# Patient Record
Sex: MALE | Race: WHITE | NOT HISPANIC OR LATINO | Employment: FULL TIME | URBAN - METROPOLITAN AREA
[De-identification: names, ages, dates, MRNs, and addresses within clinical notes are randomized per-mention and may not be internally consistent; named-entity substitution may affect disease eponyms.]

---
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
## 2021-01-20 ENCOUNTER — TELEPHONE (OUTPATIENT)
Dept: PSYCHIATRY | Facility: CLINIC | Age: 21
End: 2021-01-20

## 2021-01-20 ENCOUNTER — TELEPHONE (OUTPATIENT)
Dept: OTHER | Facility: OTHER | Age: 21
End: 2021-01-20

## 2021-03-03 ENCOUNTER — OFFICE VISIT (OUTPATIENT)
Dept: URGENT CARE | Age: 21
End: 2021-03-03
Payer: COMMERCIAL

## 2021-03-03 VITALS — HEART RATE: 84 BPM | RESPIRATION RATE: 20 BRPM | OXYGEN SATURATION: 98 % | TEMPERATURE: 96.1 F

## 2021-03-03 DIAGNOSIS — B96.89 ACUTE BACTERIAL PHARYNGITIS: Primary | ICD-10-CM

## 2021-03-03 DIAGNOSIS — J02.8 ACUTE BACTERIAL PHARYNGITIS: Primary | ICD-10-CM

## 2021-03-03 LAB — S PYO AG THROAT QL: NEGATIVE

## 2021-03-03 PROCEDURE — 87070 CULTURE OTHR SPECIMN AEROBIC: CPT | Performed by: PHYSICIAN ASSISTANT

## 2021-03-03 PROCEDURE — 99203 OFFICE O/P NEW LOW 30 MIN: CPT | Performed by: PHYSICIAN ASSISTANT

## 2021-03-03 PROCEDURE — 87880 STREP A ASSAY W/OPTIC: CPT | Performed by: PHYSICIAN ASSISTANT

## 2021-03-03 RX ORDER — AMOXICILLIN 500 MG/1
500 CAPSULE ORAL EVERY 12 HOURS SCHEDULED
Qty: 20 CAPSULE | Refills: 0 | Status: SHIPPED | OUTPATIENT
Start: 2021-03-03 | End: 2021-03-13

## 2021-03-03 NOTE — PROGRESS NOTES
[facility]        NAME: Dennie Saba is a 23 y o  male  : 2000    MRN: [id number]  DATE: March 3, 2021  TIME: 4:34 PM    Assessment and Plan   Acute bacterial pharyngitis [J02 8, B96 89]  1  Acute bacterial pharyngitis  amoxicillin (AMOXIL) 500 mg capsule    Throat culture    POCT rapid strepA         Patient Instructions       Follow up with PCP in 3-5 days  Proceed to  ER if symptoms worsen  Chief Complaint     Chief Complaint   Patient presents with    Sore Throat     Pt states started with symptoms Friday, had covid test which came back today and is negative, continues with sore throat    Headache         History of Present Illness         Patient started with a sore throat on Friday  He has also been having headaches  He denies any fevers  He denies any COVID exposure  He had a COVID test that was done at work on Friday which came back negative  He does have pain with swallowing  Review of Systems   Review of Systems   Constitutional: Negative  HENT: Positive for sore throat  Respiratory: Negative  Cardiovascular: Negative  Musculoskeletal: Negative  Neurological: Positive for headaches  Psychiatric/Behavioral: Negative  Current Medications       Current Outpatient Medications:     amoxicillin (AMOXIL) 500 mg capsule, Take 1 capsule (500 mg total) by mouth every 12 (twelve) hours for 10 days, Disp: 20 capsule, Rfl: 0    Current Allergies     Allergies as of 2021    (No Known Allergies)            The following portions of the patient's history were reviewed and updated as appropriate: allergies, current medications, past family history, past medical history, past social history, past surgical history and problem list      History reviewed  No pertinent past medical history  History reviewed  No pertinent surgical history  History reviewed  No pertinent family history  Medications have been verified          Objective   Pulse 84   Temp (!) 96 1 °F (35 6 °C)   Resp 20   SpO2 98%        Physical Exam     Physical Exam  Vitals signs and nursing note reviewed  Constitutional:       General: He is not in acute distress  Appearance: Normal appearance  He is not ill-appearing, toxic-appearing or diaphoretic  HENT:      Head: Normocephalic and atraumatic  Right Ear: Tympanic membrane normal  No middle ear effusion  Tympanic membrane is not erythematous  Left Ear: Tympanic membrane normal   No middle ear effusion  Tympanic membrane is not erythematous  Nose: No congestion  Mouth/Throat:      Mouth: Mucous membranes are moist       Pharynx: Uvula midline  Posterior oropharyngeal erythema present  No oropharyngeal exudate  Tonsils: No tonsillar exudate  Cardiovascular:      Rate and Rhythm: Normal rate and regular rhythm  Pulses: Normal pulses  Heart sounds: Normal heart sounds  Pulmonary:      Effort: Pulmonary effort is normal       Breath sounds: Normal breath sounds  No wheezing  Lymphadenopathy:      Cervical: Cervical adenopathy present  Skin:     General: Skin is warm and dry  Neurological:      General: No focal deficit present  Mental Status: He is alert and oriented to person, place, and time     Psychiatric:         Mood and Affect: Mood normal          Behavior: Behavior normal

## 2021-03-05 LAB — BACTERIA THROAT CULT: NORMAL

## 2021-08-22 ENCOUNTER — OFFICE VISIT (OUTPATIENT)
Dept: URGENT CARE | Age: 21
End: 2021-08-22
Payer: COMMERCIAL

## 2021-08-22 VITALS
SYSTOLIC BLOOD PRESSURE: 148 MMHG | HEART RATE: 72 BPM | RESPIRATION RATE: 16 BRPM | DIASTOLIC BLOOD PRESSURE: 87 MMHG | TEMPERATURE: 98.7 F | OXYGEN SATURATION: 97 %

## 2021-08-22 DIAGNOSIS — K08.89 PAIN, DENTAL: Primary | ICD-10-CM

## 2021-08-22 PROCEDURE — 99213 OFFICE O/P EST LOW 20 MIN: CPT | Performed by: PHYSICIAN ASSISTANT

## 2021-08-22 RX ORDER — CITALOPRAM 10 MG/1
10 TABLET ORAL DAILY
COMMUNITY
Start: 2021-07-30

## 2021-08-22 RX ORDER — TRAZODONE HYDROCHLORIDE 50 MG/1
TABLET ORAL
COMMUNITY
Start: 2021-07-30

## 2021-08-22 RX ORDER — IBUPROFEN 600 MG/1
600 TABLET ORAL EVERY 8 HOURS SCHEDULED
Qty: 30 TABLET | Refills: 0 | Status: SHIPPED | OUTPATIENT
Start: 2021-08-22 | End: 2021-09-01

## 2021-08-22 NOTE — PATIENT INSTRUCTIONS
Follow-up with the dentist for definitive care  Go to emergency room for further evaluation if you have any worsening symptoms  Watch for any increasing pain or facial swelling and get rechecked immediately        May apply warm compresses as needed

## 2021-08-22 NOTE — PROGRESS NOTES
[facility]        NAME: Isa Guzman is a 24 y o  male  : 2000    MRN: [id number]  DATE: 2021  TIME: 7:24 PM    Assessment and Plan   Pain, dental [K08 89]  1  Pain, dental  al mag oxide-diphenhydramine-lidocaine viscous (MAGIC MOUTHWASH) 1:1:1 suspension    ibuprofen (MOTRIN) 600 mg tablet         Patient Instructions       Follow up with PCP in 3-5 days  Proceed to  ER if symptoms worsen  Chief Complaint     Chief Complaint   Patient presents with    Dental Pain     pt states having pain right upper jaw for a coiuple of weeks, saw dentist last Wednesday and told he needs tooth pulled, can't get an appointment until the end of Sept  States tooth is becoming more painful and pain is constant now         History of Present Illness        Patient here for evaluation of pain in his right upper jaw  Patient has had the pain for couple weeks  He did see dentist on Wednesday and a recommend having the tooth extracted  They did have x-rays done at that time which did not show any signs of infection or abscess  Patient denies any facial swelling or redness  Review of Systems   Review of Systems   Constitutional: Negative  HENT: Positive for dental problem  Negative for congestion, ear discharge, ear pain, facial swelling, rhinorrhea, sinus pressure, sinus pain, sneezing, sore throat and trouble swallowing  Eyes: Negative  Respiratory: Negative            Current Medications       Current Outpatient Medications:     al mag oxide-diphenhydramine-lidocaine viscous (MAGIC MOUTHWASH) 1:1:1 suspension, Swish and spit 10 mL every 4 (four) hours as needed for mouth pain or discomfort, Disp: 90 mL, Rfl: 1    citalopram (CeleXA) 10 mg tablet, Take 10 mg by mouth daily, Disp: , Rfl:     ibuprofen (MOTRIN) 600 mg tablet, Take 1 tablet (600 mg total) by mouth every 8 (eight) hours for 10 days, Disp: 30 tablet, Rfl: 0    traZODone (DESYREL) 50 mg tablet, take 3 tablets nightly, Disp: , Rfl:     Current Allergies     Allergies as of 08/22/2021    (No Known Allergies)            The following portions of the patient's history were reviewed and updated as appropriate: allergies, current medications, past family history, past medical history, past social history, past surgical history and problem list      History reviewed  No pertinent past medical history  History reviewed  No pertinent surgical history  History reviewed  No pertinent family history  Medications have been verified  Objective   /87   Pulse 72   Temp 98 7 °F (37 1 °C)   Resp 16   SpO2 97%   No LMP for male patient  Physical Exam     Physical Exam  Vitals and nursing note reviewed  Constitutional:       General: He is not in acute distress  Appearance: Normal appearance  He is well-developed  He is not ill-appearing, toxic-appearing or diaphoretic  HENT:      Head: Normocephalic and atraumatic  Mouth/Throat:      Comments: Right upper molar with dental caries  No surrounding erythema  No facial swelling  No evidence of abscess  Eyes:      General:         Right eye: No discharge  Left eye: No discharge  Extraocular Movements: Extraocular movements intact  Conjunctiva/sclera: Conjunctivae normal       Pupils: Pupils are equal, round, and reactive to light  Lymphadenopathy:      Cervical: No cervical adenopathy  Skin:     General: Skin is warm and dry  Neurological:      General: No focal deficit present  Mental Status: He is alert and oriented to person, place, and time  Psychiatric:         Mood and Affect: Mood normal          Behavior: Behavior normal          Thought Content:  Thought content normal          Judgment: Judgment normal

## 2021-08-29 ENCOUNTER — OFFICE VISIT (OUTPATIENT)
Dept: LAB | Age: 21
End: 2021-08-29
Payer: COMMERCIAL

## 2021-08-29 DIAGNOSIS — Z13.6 SCREENING FOR ISCHEMIC HEART DISEASE: ICD-10-CM

## 2021-08-29 LAB
ATRIAL RATE: 60 BPM
P AXIS: 24 DEGREES
PR INTERVAL: 136 MS
QRS AXIS: 57 DEGREES
QRSD INTERVAL: 112 MS
QT INTERVAL: 406 MS
QTC INTERVAL: 406 MS
T WAVE AXIS: 42 DEGREES
VENTRICULAR RATE: 60 BPM

## 2021-08-29 PROCEDURE — 93005 ELECTROCARDIOGRAM TRACING: CPT

## 2021-08-29 PROCEDURE — 93010 ELECTROCARDIOGRAM REPORT: CPT | Performed by: INTERNAL MEDICINE

## 2022-01-06 ENCOUNTER — OFFICE VISIT (OUTPATIENT)
Dept: FAMILY MEDICINE CLINIC | Facility: CLINIC | Age: 22
End: 2022-01-06
Payer: COMMERCIAL

## 2022-01-06 VITALS
BODY MASS INDEX: 18.46 KG/M2 | HEIGHT: 69 IN | OXYGEN SATURATION: 97 % | TEMPERATURE: 97.6 F | RESPIRATION RATE: 16 BRPM | WEIGHT: 124.6 LBS | DIASTOLIC BLOOD PRESSURE: 70 MMHG | SYSTOLIC BLOOD PRESSURE: 110 MMHG | HEART RATE: 67 BPM

## 2022-01-06 DIAGNOSIS — K59.00 CONSTIPATION, UNSPECIFIED CONSTIPATION TYPE: ICD-10-CM

## 2022-01-06 DIAGNOSIS — F90.9 ATTENTION DEFICIT HYPERACTIVITY DISORDER (ADHD), UNSPECIFIED ADHD TYPE: ICD-10-CM

## 2022-01-06 DIAGNOSIS — N52.9 ERECTILE DYSFUNCTION, UNSPECIFIED ERECTILE DYSFUNCTION TYPE: Primary | ICD-10-CM

## 2022-01-06 DIAGNOSIS — F32.1 CURRENT MODERATE EPISODE OF MAJOR DEPRESSIVE DISORDER, UNSPECIFIED WHETHER RECURRENT (HCC): ICD-10-CM

## 2022-01-06 PROCEDURE — 3725F SCREEN DEPRESSION PERFORMED: CPT | Performed by: INTERNAL MEDICINE

## 2022-01-06 PROCEDURE — 3008F BODY MASS INDEX DOCD: CPT | Performed by: INTERNAL MEDICINE

## 2022-01-06 PROCEDURE — 1036F TOBACCO NON-USER: CPT | Performed by: INTERNAL MEDICINE

## 2022-01-06 PROCEDURE — 99204 OFFICE O/P NEW MOD 45 MIN: CPT | Performed by: INTERNAL MEDICINE

## 2022-01-06 RX ORDER — DEXTROAMPHETAMINE SACCHARATE, AMPHETAMINE ASPARTATE, DEXTROAMPHETAMINE SULFATE AND AMPHETAMINE SULFATE 2.5; 2.5; 2.5; 2.5 MG/1; MG/1; MG/1; MG/1
10 TABLET ORAL
COMMUNITY

## 2022-01-06 NOTE — PROGRESS NOTES
Assessment/Plan:         Problem List Items Addressed This Visit        Other    Erectile dysfunction - Primary     Delayed ejculation  Has it for long time even before started taking celexa  Mood 80% better with celexa         Relevant Orders    CBC and differential    Comprehensive metabolic panel    TSH, 3rd generation    Current moderate episode of major depressive disorder (HCC)     Mood 80% better with celexa  But sexual isses r same- delayed ejeculation  ?trintellix might help better  Patient has appt with his psychiatrist  He will discuss with them tomorrow  Relevant Medications    amphetamine-dextroamphetamine (ADDERALL, 10MG,) 10 mg tablet    Other Relevant Orders    CBC and differential    Comprehensive metabolic panel    TSH, 3rd generation    Attention deficit hyperactivity disorder (ADHD)     Seeing psych  Relevant Medications    amphetamine-dextroamphetamine (ADDERALL, 10MG,) 10 mg tablet    Constipation     Sense of incomplete BM for 2 months  No melena/blood  Advised to try more fibres  Consider referring to GI  Subjective:      Patient ID: Marko Jones is a 24 y o  male  1  ED- patient has a history of delayed ejaculation for several years even before he started taking psych medications  Sometimes he gets tired and he gives up during relationship sexual activity  No back pain  No lower extremity weakness  No significant weight changes  His mood is 80% better with Celexa  No trouble with erection  No blood in the urine  No penile discharge  No abdominal pain  No headache or dizziness  No change in the vision  No gynecomastia  2  Trouble with bowel movement  For past couple of months he is feeling that he is not able to empty bowel completely  No hard stool  No black stool or blood in the stool  No change in the appetite or weight loss  He is not always eating high-fiber diet  However nothing has change in his dietary habits    No history of colon cancer  3  Depression-mood is around 80% better since he is on citalopram   Tolerating well  No dizziness or drowsiness  No insomnia  No suicidal or homicidal ideations  He is seeing his psychiatrist   He is also on Adderall  The following portions of the patient's history were reviewed and updated as appropriate:   Past Medical History:  He has a past medical history of ADD (attention deficit disorder)  ,  _______________________________________________________________________  Medical Problems:  does not have any pertinent problems on file ,  _______________________________________________________________________  Past Surgical History:   has a past surgical history that includes [place] tooth extraction  ,  _______________________________________________________________________  Family History:  Family history is unknown by patient  ,  _______________________________________________________________________  Social History:   reports that he has never smoked  He has never used smokeless tobacco  He reports that he does not drink alcohol and does not use drugs  ,  _______________________________________________________________________  Allergies:  has No Known Allergies     _______________________________________________________________________  Current Outpatient Medications   Medication Sig Dispense Refill    amphetamine-dextroamphetamine (ADDERALL, 10MG,) 10 mg tablet Take 10 mg by mouth 2 (two) times a day      al mag oxide-diphenhydramine-lidocaine viscous (MAGIC MOUTHWASH) 1:1:1 suspension Swish and spit 10 mL every 4 (four) hours as needed for mouth pain or discomfort (Patient not taking: Reported on 1/6/2022 ) 90 mL 1    citalopram (CeleXA) 10 mg tablet Take 10 mg by mouth daily        ibuprofen (MOTRIN) 600 mg tablet Take 1 tablet (600 mg total) by mouth every 8 (eight) hours for 10 days 30 tablet 0    traZODone (DESYREL) 50 mg tablet take 3 tablets nightly (Patient not taking: Reported on 1/6/2022)       No current facility-administered medications for this visit      _______________________________________________________________________  Review of Systems   Constitutional: Negative for chills, fatigue and fever  HENT: Negative for congestion, nosebleeds and rhinorrhea  Eyes: Negative for discharge and visual disturbance  Respiratory: Negative for cough, chest tightness, shortness of breath and wheezing  Cardiovascular: Negative for chest pain and leg swelling  Gastrointestinal: Negative for abdominal distention, abdominal pain, diarrhea and vomiting  Endocrine: Negative for polydipsia and polyuria  Genitourinary: Negative for difficulty urinating, frequency and hematuria  Musculoskeletal: Negative for arthralgias, back pain and myalgias  Skin: Negative for rash  Allergic/Immunologic: Negative for environmental allergies  Neurological: Negative for dizziness, syncope, weakness, light-headedness and headaches  Hematological: Negative  Psychiatric/Behavioral: Negative for agitation, confusion, decreased concentration, dysphoric mood, hallucinations, self-injury, sleep disturbance and suicidal ideas  The patient is not nervous/anxious  All other systems reviewed and are negative  Objective:  Vitals:    01/06/22 1525   BP: 110/70   BP Location: Left arm   Patient Position: Sitting   Cuff Size: Standard   Pulse: 67   Resp: 16   Temp: 97 6 °F (36 4 °C)   TempSrc: Temporal   SpO2: 97%   Weight: 56 5 kg (124 lb 9 6 oz)   Height: 5' 8 5" (1 74 m)     Body mass index is 18 67 kg/m²  Physical Exam  Vitals and nursing note reviewed  Constitutional:       General: He is not in acute distress  Appearance: Normal appearance  He is well-developed  HENT:      Head: Normocephalic and atraumatic  Eyes:      General:         Right eye: No discharge  Left eye: No discharge  Neck:      Thyroid: No thyromegaly     Cardiovascular:      Rate and Rhythm: Normal rate and regular rhythm  Pulses: Normal pulses  Heart sounds: Normal heart sounds  No murmur heard  Pulmonary:      Effort: Pulmonary effort is normal       Breath sounds: Normal breath sounds  No wheezing or rhonchi  Abdominal:      General: Bowel sounds are normal  There is no distension  Palpations: Abdomen is soft  There is no mass  Tenderness: There is no abdominal tenderness  Musculoskeletal:         General: No swelling or tenderness  Cervical back: Neck supple  Right lower leg: No edema  Left lower leg: No edema  Lymphadenopathy:      Cervical: No cervical adenopathy  Skin:     General: Skin is warm  Capillary Refill: Capillary refill takes less than 2 seconds  Findings: No erythema  Neurological:      General: No focal deficit present  Mental Status: He is alert and oriented to person, place, and time  Psychiatric:         Mood and Affect: Mood normal          Behavior: Behavior normal          Thought Content:  Thought content normal

## 2022-01-06 NOTE — ASSESSMENT & PLAN NOTE
Sense of incomplete BM for 2 months  No melena/blood  Advised to try more fibres  Consider referring to GI

## 2022-01-06 NOTE — ASSESSMENT & PLAN NOTE
Delayed ejculation  Has it for long time even before started taking celexa   Mood 80% better with celexa

## 2022-01-06 NOTE — ASSESSMENT & PLAN NOTE
Mood 80% better with celexa  But sexual isses r same- delayed ejeculation  ?trintellix might help better  Patient has appt with his psychiatrist  He will discuss with them tomorrow

## 2022-04-05 ENCOUNTER — OFFICE VISIT (OUTPATIENT)
Dept: URGENT CARE | Age: 22
End: 2022-04-05
Payer: COMMERCIAL

## 2022-04-05 VITALS — HEART RATE: 108 BPM | TEMPERATURE: 98 F | RESPIRATION RATE: 18 BRPM | OXYGEN SATURATION: 98 %

## 2022-04-05 DIAGNOSIS — B34.9 VIRAL SYNDROME: Primary | ICD-10-CM

## 2022-04-05 PROCEDURE — 87636 SARSCOV2 & INF A&B AMP PRB: CPT

## 2022-04-05 PROCEDURE — 99213 OFFICE O/P EST LOW 20 MIN: CPT

## 2022-04-05 RX ORDER — BUPROPION HYDROCHLORIDE 100 MG/1
100 TABLET, EXTENDED RELEASE ORAL EVERY MORNING
COMMUNITY
Start: 2022-01-07

## 2022-04-05 RX ORDER — DEXTROAMPHETAMINE SACCHARATE, AMPHETAMINE ASPARTATE, DEXTROAMPHETAMINE SULFATE AND AMPHETAMINE SULFATE 5; 5; 5; 5 MG/1; MG/1; MG/1; MG/1
1 TABLET ORAL 2 TIMES DAILY
COMMUNITY
Start: 2022-03-12

## 2022-04-05 NOTE — PROGRESS NOTES
[facility]        NAME: Mamie Gonzales is a 24 y o  male  : 2000    MRN: [id number]  DATE: 2022  TIME: 6:32 PM    Assessment and Plan   Viral syndrome [B34 9]  1  Viral syndrome           Patient Instructions     Viral upper respiratory infection   COVID test sent  Follow up with PCP in 3-5 days  Proceed to  ER if symptoms worsen  Chief Complaint     Chief Complaint   Patient presents with    Fatigue    Sore Throat     stuffy nose         History of Present Illness       68-year-old male who presents complaining of sore throat, congestion, body aches, fatigue, cough x3 days  Patient states that he has been vaccinated for COVID  Patient denies chest pain, shortness off breath      Review of Systems   Review of Systems   Constitutional: Positive for fatigue  HENT: Positive for congestion and sore throat  Negative for dental problem, drooling, ear pain, postnasal drip, rhinorrhea, sinus pain, trouble swallowing and voice change  Eyes: Negative  Respiratory: Positive for cough  Negative for apnea, choking, chest tightness, shortness of breath, wheezing and stridor  Cardiovascular: Negative  Negative for chest pain           Current Medications       Current Outpatient Medications:     amphetamine-dextroamphetamine (ADDERALL) 20 mg tablet, Take 1 tablet by mouth 2 (two) times a day, Disp: , Rfl:     amphetamine-dextroamphetamine (ADDERALL, 10MG,) 10 mg tablet, Take 10 mg by mouth 2 (two) times a day, Disp: , Rfl:     citalopram (CeleXA) 10 mg tablet, Take 10 mg by mouth daily  , Disp: , Rfl:     traZODone (DESYREL) 50 mg tablet, take 3 tablets nightly, Disp: , Rfl:     al mag oxide-diphenhydramine-lidocaine viscous (MAGIC MOUTHWASH) 1:1:1 suspension, Swish and spit 10 mL every 4 (four) hours as needed for mouth pain or discomfort (Patient not taking: Reported on 2022 ), Disp: 90 mL, Rfl: 1    buPROPion (WELLBUTRIN SR) 100 mg 12 hr tablet, Take 100 mg by mouth every morning, Disp: , Rfl:     ibuprofen (MOTRIN) 600 mg tablet, Take 1 tablet (600 mg total) by mouth every 8 (eight) hours for 10 days, Disp: 30 tablet, Rfl: 0    Current Allergies     Allergies as of 04/05/2022    (No Known Allergies)            The following portions of the patient's history were reviewed and updated as appropriate: allergies, current medications, past family history, past medical history, past social history, past surgical history and problem list      Past Medical History:   Diagnosis Date    ADD (attention deficit disorder)        Past Surgical History:   Procedure Laterality Date    WISDOM TOOTH EXTRACTION      2021 UNSURE OF EXACT MONTH       Family History   Family history unknown: Yes         Medications have been verified  Objective   Pulse (!) 108   Temp 98 °F (36 7 °C)   Resp 18   SpO2 98%        Physical Exam     Physical Exam  Constitutional:       General: He is not in acute distress  Appearance: He is well-developed  He is not diaphoretic  HENT:      Head: Normocephalic and atraumatic  Right Ear: Hearing, tympanic membrane, ear canal and external ear normal       Left Ear: Hearing, tympanic membrane, ear canal and external ear normal       Nose: Rhinorrhea present  Right Sinus: No maxillary sinus tenderness or frontal sinus tenderness  Left Sinus: No maxillary sinus tenderness or frontal sinus tenderness  Mouth/Throat:      Pharynx: Uvula midline  Cardiovascular:      Rate and Rhythm: Normal rate and regular rhythm  Heart sounds: Normal heart sounds  Pulmonary:      Effort: Pulmonary effort is normal  No respiratory distress  Breath sounds: Normal breath sounds  No stridor  No wheezing, rhonchi or rales  Chest:      Chest wall: No tenderness  Musculoskeletal:      Cervical back: Normal range of motion and neck supple  Lymphadenopathy:      Cervical: No cervical adenopathy  Neurological:      Mental Status: He is alert

## 2022-04-05 NOTE — PATIENT INSTRUCTIONS
Viral upper respiratory infection   COVID test sent  Follow up with PCP in 3-5 days  Proceed to  ER if symptoms worsen

## 2022-04-07 LAB
FLUAV RNA RESP QL NAA+PROBE: NEGATIVE
FLUBV RNA RESP QL NAA+PROBE: NEGATIVE
SARS-COV-2 RNA RESP QL NAA+PROBE: NEGATIVE

## 2022-11-24 ENCOUNTER — HOSPITAL ENCOUNTER (EMERGENCY)
Facility: HOSPITAL | Age: 22
Discharge: HOME | End: 2022-11-24
Attending: EMERGENCY MEDICINE

## 2022-11-24 VITALS
DIASTOLIC BLOOD PRESSURE: 87 MMHG | TEMPERATURE: 98 F | RESPIRATION RATE: 18 BRPM | HEART RATE: 123 BPM | OXYGEN SATURATION: 100 % | SYSTOLIC BLOOD PRESSURE: 138 MMHG
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
## 2022-11-24 DIAGNOSIS — M54.6 ACUTE BILATERAL THORACIC BACK PAIN: ICD-10-CM

## 2022-11-24 RX ORDER — IBUPROFEN 600 MG/1
600 TABLET ORAL ONCE
Status: COMPLETED | OUTPATIENT
Start: 2022-11-24 | End: 2022-11-24

## 2022-11-24 RX ADMIN — IBUPROFEN 600 MG: 600 TABLET, FILM COATED ORAL at 18:23
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
## 2022-11-25 LAB
ATRIAL RATE: 120 BPM
P AXIS: 46 DEGREES
PR INTERVAL: 124 MS
QRS AXIS: 82 DEGREES
QRSD INTERVAL: 102 MS
QT INTERVAL: 298 MS
QTC INTERVAL: 421 MS
T WAVE AXIS: 22 DEGREES
VENTRICULAR RATE: 120 BPM

## 2023-05-08 ENCOUNTER — AMB VIDEO VISIT (OUTPATIENT)
Dept: OTHER | Facility: HOSPITAL | Age: 23
End: 2023-05-08

## 2023-05-08 DIAGNOSIS — J06.9 UPPER RESPIRATORY TRACT INFECTION, UNSPECIFIED TYPE: Primary | ICD-10-CM

## 2023-05-08 NOTE — PATIENT INSTRUCTIONS
As we discussed your illness is viral   No antibiotics are indicated at this time  Rest and drink extra fluids  OTC cough and cold medications as needed  Tylenol or Motrin as needed for pain or fever  Salt water gargles and throat lozenges for sore throat  Follow up with PCP if no improvement  Go to ER with worsening symptoms  Cold Symptoms   WHAT YOU NEED TO KNOW:   A cold is an infection caused by a virus  The infection causes your upper respiratory system to become inflamed  Common symptoms of a cold include sneezing, dry throat, a stuffy nose, headache, watery eyes, and a cough  Your cough may be dry, or you may cough up mucus  You may also have muscle aches, joint pain, and tiredness  Rarely, you may have a fever  Most colds go away without treatment  DISCHARGE INSTRUCTIONS:   Return to the emergency department if:   You have increased tiredness and weakness  You are unable to eat  Your heart is beating much faster than usual for you  You see white spots in the back of your throat and your neck is swollen and sore to the touch  You see pinpoint or larger reddish-purple dots on your skin  Contact your healthcare provider if:   You have a fever higher than 102°F (38 9°C)  You have new or worsening shortness of breath  You have thick nasal drainage for more than 2 days  Your symptoms do not improve or get worse within 5 days  You have questions or concerns about your condition or care  Medicines: The following medicines may be suggested by your healthcare provider to decrease your cold symptoms  These medicines are available without a doctor's order  Ask which medicines to take and when to take them  Follow directions  NSAIDs or acetaminophen  help to bring down a fever or decrease pain  Decongestants  help decrease nasal stuffiness  Antihistamines  help decrease sneezing and a runny nose  Cough suppressants  help decrease how much you cough      Expectorants help loosen mucus so you can cough it up  Take your medicine as directed  Contact your healthcare provider if you think your medicine is not helping or if you have side effects  Tell him of her if you are allergic to any medicine  Keep a list of the medicines, vitamins, and herbs you take  Include the amounts, and when and why you take them  Bring the list or the pill bottles to follow-up visits  Carry your medicine list with you in case of an emergency  Symptom relief: The following may help relieve cold symptoms, such as a dry throat and congestion:  Gargle with mouthwash or warm salt water as directed  Suck on throat lozenges or hard candy  Use a cold or warm vaporizer or humidifier to ease your breathing  Rest for at least 2 days and then as needed to decrease tiredness and weakness  Use petroleum based jelly around your nostrils to decrease irritation from blowing your nose  Drink liquids:  Liquids will help thin and loosen thick mucus so you can cough it up  Liquids will also keep you hydrated  Ask your healthcare provider which liquids are best for you and how much to drink each day  Prevent the spread of germs: You can spread your cold germs to others for at least 3 days after your symptoms start  Wash your hands often  Do not share items, such as eating utensils  Cover your nose and mouth when you cough or sneeze using the crook of your elbow instead of your hands  Throw used tissues in the garbage  Do not smoke:  Smoking may worsen your symptoms and increase the length of time you feel sick  Talk with your healthcare provider if you need help to stop smoking  Follow up with your healthcare provider as directed:  Write down your questions so you remember to ask them during your visits  © 2017 [employer] Information is for End User's use only and may not be sold, redistributed or otherwise used for commercial purposes   All illustrations and images included in CareNotes® are the copyrighted property of A D [employer] M [employer] Inc  or [employer]  The above information is an  only  It is not intended as medical advice for individual conditions or treatments  Talk to your doctor, nurse or pharmacist before following any medical regimen to see if it is safe and effective for you

## 2023-05-08 NOTE — PROGRESS NOTES
Video Visit - Joel Shelton 20 y o  male MRN: [id number]    REQUIRED DOCUMENTATION:         1  This service was provided via [facility]  2  Provider located at [address] [place] [id number] [phone]  3  [facility] provider: TIFF Lazo  4  Identify all parties in room with patient during [facility] visit:  Patient   5  After connecting through Pembe Panjur, patient was identified by name and date of birth  Patient was then informed that this was a Telemedicine visit and that the exam was being conducted confidentially over secure lines  My office door was closed  No one else was in the room  Patient acknowledged consent and understanding of privacy and security of the Telemedicine visit  I informed the patient that I have reviewed their record in Epic and presented the opportunity for them to ask any questions regarding the visit today  The patient agreed to participate  This is a 20year old male here today for video visit  He states he had sore throat for last few days symptoms are getting better  He denies at cough and congestion at this time, did have a cough but resolved  No fever, body aches or chills  He did have upset stomach and diarrhea  Overall toay he is feeling better  Needs a note to return to work  Review of Systems   Constitutional: Negative for activity change, chills, fatigue and fever  HENT: Positive for congestion, rhinorrhea, sinus pressure, sinus pain and sore throat  Respiratory: Positive for cough  Neurological: Negative  Psychiatric/Behavioral: Negative  There were no vitals filed for this visit  Physical Exam  Constitutional:       General: He is not in acute distress  Appearance: Normal appearance  He is not ill-appearing or toxic-appearing  HENT:      Head: Normocephalic and atraumatic     Eyes:      Conjunctiva/sclera: Conjunctivae normal    Pulmonary:      Effort: Pulmonary effort is normal  No respiratory distress  Neurological:      Mental Status: He is alert and oriented to person, place, and time  Psychiatric:         Mood and Affect: Mood normal          Behavior: Behavior normal          Thought Content: Thought content normal          Judgment: Judgment normal        Diagnoses and all orders for this visit:    Upper respiratory tract infection, unspecified type      Patient Instructions   As we discussed your illness is viral   No antibiotics are indicated at this time  Rest and drink extra fluids  OTC cough and cold medications as needed  Tylenol or Motrin as needed for pain or fever  Salt water gargles and throat lozenges for sore throat  Follow up with PCP if no improvement  Go to ER with worsening symptoms  Cold Symptoms   WHAT YOU NEED TO KNOW:   A cold is an infection caused by a virus  The infection causes your upper respiratory system to become inflamed  Common symptoms of a cold include sneezing, dry throat, a stuffy nose, headache, watery eyes, and a cough  Your cough may be dry, or you may cough up mucus  You may also have muscle aches, joint pain, and tiredness  Rarely, you may have a fever  Most colds go away without treatment  DISCHARGE INSTRUCTIONS:   Return to the emergency department if:   · You have increased tiredness and weakness  · You are unable to eat  · Your heart is beating much faster than usual for you  · You see white spots in the back of your throat and your neck is swollen and sore to the touch  · You see pinpoint or larger reddish-purple dots on your skin  Contact your healthcare provider if:   · You have a fever higher than 102°F (38 9°C)  · You have new or worsening shortness of breath  · You have thick nasal drainage for more than 2 days  · Your symptoms do not improve or get worse within 5 days  · You have questions or concerns about your condition or care  Medicines:   The following medicines may be suggested by your healthcare provider to decrease your cold symptoms  These medicines are available without a doctor's order  Ask which medicines to take and when to take them  Follow directions  · NSAIDs or acetaminophen  help to bring down a fever or decrease pain  · Decongestants  help decrease nasal stuffiness  · Antihistamines  help decrease sneezing and a runny nose  · Cough suppressants  help decrease how much you cough  · Expectorants  help loosen mucus so you can cough it up  · Take your medicine as directed  Contact your healthcare provider if you think your medicine is not helping or if you have side effects  Tell him of her if you are allergic to any medicine  Keep a list of the medicines, vitamins, and herbs you take  Include the amounts, and when and why you take them  Bring the list or the pill bottles to follow-up visits  Carry your medicine list with you in case of an emergency  Symptom relief: The following may help relieve cold symptoms, such as a dry throat and congestion:  · Gargle with mouthwash or warm salt water as directed  · Suck on throat lozenges or hard candy  · Use a cold or warm vaporizer or humidifier to ease your breathing  · Rest for at least 2 days and then as needed to decrease tiredness and weakness  · Use petroleum based jelly around your nostrils to decrease irritation from blowing your nose  Drink liquids:  Liquids will help thin and loosen thick mucus so you can cough it up  Liquids will also keep you hydrated  Ask your healthcare provider which liquids are best for you and how much to drink each day  Prevent the spread of germs: You can spread your cold germs to others for at least 3 days after your symptoms start  Wash your hands often  Do not share items, such as eating utensils  Cover your nose and mouth when you cough or sneeze using the crook of your elbow instead of your hands  Throw used tissues in the garbage    Do not smoke:  Smoking may worsen your symptoms and increase the length of time you feel sick  Talk with your healthcare provider if you need help to stop smoking  Follow up with your healthcare provider as directed:  Write down your questions so you remember to ask them during your visits  © 2017 [employer] Information is for End User's use only and may not be sold, redistributed or otherwise used for commercial purposes  All illustrations and images included in CareNotes® are the copyrighted property of [employer] M [employer] Inc  or [employer]  The above information is an  only  It is not intended as medical advice for individual conditions or treatments  Talk to your doctor, nurse or pharmacist before following any medical regimen to see if it is safe and effective for you  Follow up with PCP if not improved, if symptoms are worse, go to the ER

## 2023-05-08 NOTE — CARE ANYWHERE EVISITS
Visit Summary for Varun Duff Single - Gender: Male - Date of Birth: [id number]  Date: [id number] - Duration: 3 minutes  Patient: Marva Duff Single  Provider: Kinza MATTHEW    Patient Contact Information  Address  [place]  [phone]    Visit Topics  Cold [Added By: Self - 2023-05-08]    Triage Questions   What is your current physical address in the event of a medical emergency? Answer []  Are you allergic to any medications? Answer []  Are you now or could you be pregnant? Answer []  Do you have any immune system compromise or chronic lung   disease? Answer []  Do you have any vulnerable family members in the home (infant, pregnant, cancer, elderly)? Answer []     Conversation Transcripts  [0A][0A] [Notification] You are connected with Patrice Wyatt, Urgent Care Specialist [0A][Notification] Emily Navarro is located in [place]  [0A][Notification] Emily Navarro has shared health history  Castalia Shreyas  [0A]    Diagnosis  Acute upper respiratory infection, unspecified    Procedures  Value: [id number] Code: CPT-4 UNLISTED E&M SERVICE    Medications Prescribed    No prescriptions ordered    Electronically signed by: Patrice Jerez(NPI [id number])

## 2023-05-08 NOTE — LETTER
May 8, 2023     Jonny Spain    Patient: Irene Herrera   YOB: 2000   Date of Visit: 5/8/2023     [facility] VIDEO VISIT VIR  [address], [address] [address]    May 8, 2023     Patient: Irene Herrera   YOB: 2000   Date of Visit: 5/8/2023       To Whom it May Concern: Radha Rhoades is under my professional care  He was seen virtually on 5/8/2023  He may return to work on 05/10/2023 please excuse on 05/06-05/08       If you have any questions or concerns, please don't hesitate to call           Sincerely,          TIFF Leigh        CC: No Recipients

## 2023-06-15 ENCOUNTER — TELEPHONE (OUTPATIENT)
Dept: PSYCHIATRY | Facility: CLINIC | Age: 23
End: 2023-06-15

## 2023-06-15 NOTE — TELEPHONE ENCOUNTER
Patient called regarding services  Placed on Epic wait list for med mgmt with preferences below  Sent extra Eriberto Gusman, no gend pref, open to virtual, no ROF

## 2023-07-06 ENCOUNTER — AMB VIDEO VISIT (OUTPATIENT)
Dept: OTHER | Facility: HOSPITAL | Age: 23
End: 2023-07-06

## 2023-07-06 VITALS — RESPIRATION RATE: 16 BRPM

## 2023-07-06 PROCEDURE — ECARE PR SL URGENT CARE VIRTUAL VISIT: Performed by: NURSE PRACTITIONER

## 2023-07-06 NOTE — PROGRESS NOTES
Video Visit - Leno Thompson 24 y.o. male MRN: [id number]    REQUIRED DOCUMENTATION:         1. This service was provided via [employer]. 2. Provider located at [facility]  [address] [facility] [address] [phone]. 3. [facility] provider: TIFF Bassett. 4. Identify all parties in room with patient during [facility] visit:  Patient   5. After connecting through televideo, patient was identified by name and date of birth. Patient was then informed that this was a Telemedicine visit and that the exam was being conducted confidentially over secure lines. My office door was closed. No one else was in the room. Patient acknowledged consent and understanding of privacy and security of the Telemedicine visit. I informed the patient that I have reviewed their record in Epic and presented the opportunity for them to ask any questions regarding the visit today. The patient agreed to participate. This is a 24year old male here today for video visit. He is having cough, congestion, runny nose and sneezing. He he has not had any fevers but some body aches. No chest pain or sob. No nausea or vomiting. He is eating and drinking. He is using OTC mucinex. He needs a note to return to work. Review of Systems   Constitutional: Negative. HENT: Positive for congestion and rhinorrhea. Negative for sinus pressure and sinus pain. Respiratory: Positive for cough. Neurological: Negative. Psychiatric/Behavioral: Negative. Vitals:    07/06/23 1808   Resp: 16     Physical Exam  HENT:      Head: Normocephalic and atraumatic. Eyes:      Conjunctiva/sclera: Conjunctivae normal.   Pulmonary:      Effort: Pulmonary effort is normal. No respiratory distress. Comments: No cough or audible wheezing   Neurological:      Mental Status: He is oriented to person, place, and time.    Psychiatric:         Mood and Affect: Mood normal.         Behavior: Behavior normal.         Thought Content: Thought content normal.         Judgment: Judgment normal.       There are no diagnoses linked to this encounter. Patient Instructions   As we discussed your illness is viral.  No antibiotics are indicated at this time. Rest and drink extra fluids. OTC cough and cold medications as needed. Tylenol or Motrin as needed for pain or fever. Salt water gargles and throat lozenges for sore throat. Follow up with PCP if no improvement. Go to ER with worsening symptoms. Cold Symptoms   WHAT YOU NEED TO KNOW:   A cold is an infection caused by a virus. The infection causes your upper respiratory system to become inflamed. Common symptoms of a cold include sneezing, dry throat, a stuffy nose, headache, watery eyes, and a cough. Your cough may be dry, or you may cough up mucus. You may also have muscle aches, joint pain, and tiredness. Rarely, you may have a fever. Most colds go away without treatment. DISCHARGE INSTRUCTIONS:   Return to the emergency department if:   · You have increased tiredness and weakness. · You are unable to eat. · Your heart is beating much faster than usual for you. · You see white spots in the back of your throat and your neck is swollen and sore to the touch. · You see pinpoint or larger reddish-purple dots on your skin. Contact your healthcare provider if:   · You have a fever higher than 102°F (38.9°C). · You have new or worsening shortness of breath. · You have thick nasal drainage for more than 2 days. · Your symptoms do not improve or get worse within 5 days. · You have questions or concerns about your condition or care. Medicines: The following medicines may be suggested by your healthcare provider to decrease your cold symptoms. These medicines are available without a doctor's order. Ask which medicines to take and when to take them. Follow directions.   · NSAIDs or acetaminophen  help to bring down a fever or decrease pain.    · Decongestants  help decrease nasal stuffiness. · Antihistamines  help decrease sneezing and a runny nose. · Cough suppressants  help decrease how much you cough. · Expectorants  help loosen mucus so you can cough it up. · Take your medicine as directed. Contact your healthcare provider if you think your medicine is not helping or if you have side effects. Tell him of her if you are allergic to any medicine. Keep a list of the medicines, vitamins, and herbs you take. Include the amounts, and when and why you take them. Bring the list or the pill bottles to follow-up visits. Carry your medicine list with you in case of an emergency. Symptom relief: The following may help relieve cold symptoms, such as a dry throat and congestion:  · Gargle with mouthwash or warm salt water as directed. · Suck on throat lozenges or hard candy. · Use a cold or warm vaporizer or humidifier to ease your breathing. · Rest for at least 2 days and then as needed to decrease tiredness and weakness. · Use petroleum based jelly around your nostrils to decrease irritation from blowing your nose. Drink liquids:  Liquids will help thin and loosen thick mucus so you can cough it up. Liquids will also keep you hydrated. Ask your healthcare provider which liquids are best for you and how much to drink each day. Prevent the spread of germs: You can spread your cold germs to others for at least 3 days after your symptoms start. Wash your hands often. Do not share items, such as eating utensils. Cover your nose and mouth when you cough or sneeze using the crook of your elbow instead of your hands. Throw used tissues in the garbage. Do not smoke:  Smoking may worsen your symptoms and increase the length of time you feel sick. Talk with your healthcare provider if you need help to stop smoking.   Follow up with your healthcare provider as directed:  Write down your questions so you remember to ask them during your visits. © 2017 [address] [facility] Information is for End User's use only and may not be sold, redistributed or otherwise used for commercial purposes. All illustrations and images included in CareNotes® are the copyrighted property of [employer] Inc. or [employer]. The above information is an  only. It is not intended as medical advice for individual conditions or treatments. Talk to your doctor, nurse or pharmacist before following any medical regimen to see if it is safe and effective for you. Follow up with PCP if not improved, if symptoms are worse, go to the ER.

## 2023-07-06 NOTE — PATIENT INSTRUCTIONS
As we discussed your illness is viral.  No antibiotics are indicated at this time. Rest and drink extra fluids. OTC cough and cold medications as needed. Tylenol or Motrin as needed for pain or fever. Salt water gargles and throat lozenges for sore throat. Follow up with PCP if no improvement. Go to ER with worsening symptoms. Cold Symptoms   WHAT YOU NEED TO KNOW:   A cold is an infection caused by a virus. The infection causes your upper respiratory system to become inflamed. Common symptoms of a cold include sneezing, dry throat, a stuffy nose, headache, watery eyes, and a cough. Your cough may be dry, or you may cough up mucus. You may also have muscle aches, joint pain, and tiredness. Rarely, you may have a fever. Most colds go away without treatment. DISCHARGE INSTRUCTIONS:   Return to the emergency department if:   You have increased tiredness and weakness. You are unable to eat. Your heart is beating much faster than usual for you. You see white spots in the back of your throat and your neck is swollen and sore to the touch. You see pinpoint or larger reddish-purple dots on your skin. Contact your healthcare provider if:   You have a fever higher than 102°F (38.9°C). You have new or worsening shortness of breath. You have thick nasal drainage for more than 2 days. Your symptoms do not improve or get worse within 5 days. You have questions or concerns about your condition or care. Medicines: The following medicines may be suggested by your healthcare provider to decrease your cold symptoms. These medicines are available without a doctor's order. Ask which medicines to take and when to take them. Follow directions. NSAIDs or acetaminophen  help to bring down a fever or decrease pain. Decongestants  help decrease nasal stuffiness. Antihistamines  help decrease sneezing and a runny nose. Cough suppressants  help decrease how much you cough.     Expectorants help loosen mucus so you can cough it up. Take your medicine as directed. Contact your healthcare provider if you think your medicine is not helping or if you have side effects. Tell him of her if you are allergic to any medicine. Keep a list of the medicines, vitamins, and herbs you take. Include the amounts, and when and why you take them. Bring the list or the pill bottles to follow-up visits. Carry your medicine list with you in case of an emergency. Symptom relief: The following may help relieve cold symptoms, such as a dry throat and congestion:  Gargle with mouthwash or warm salt water as directed. Suck on throat lozenges or hard candy. Use a cold or warm vaporizer or humidifier to ease your breathing. Rest for at least 2 days and then as needed to decrease tiredness and weakness. Use petroleum based jelly around your nostrils to decrease irritation from blowing your nose. Drink liquids:  Liquids will help thin and loosen thick mucus so you can cough it up. Liquids will also keep you hydrated. Ask your healthcare provider which liquids are best for you and how much to drink each day. Prevent the spread of germs: You can spread your cold germs to others for at least 3 days after your symptoms start. Wash your hands often. Do not share items, such as eating utensils. Cover your nose and mouth when you cough or sneeze using the crook of your elbow instead of your hands. Throw used tissues in the garbage. Do not smoke:  Smoking may worsen your symptoms and increase the length of time you feel sick. Talk with your healthcare provider if you need help to stop smoking. Follow up with your healthcare provider as directed:  Write down your questions so you remember to ask them during your visits. © 2017 [address] [place] [address] [place] Information is for End User's use only and may not be sold, redistributed or otherwise used for commercial purposes.  All illustrations and images included in CareNotes® are the copyrighted property of [employer] or [employer]. The above information is an  only. It is not intended as medical advice for individual conditions or treatments. Talk to your doctor, nurse or pharmacist before following any medical regimen to see if it is safe and effective for you.

## 2023-07-06 NOTE — LETTER
July 6, 2023     Taylor Zaragoza    Patient: Gio Lin   YOB: 2000   Date of Visit: 7/6/2023 July 6, 2023     Patient: Gio Lin   YOB: 2000   Date of Visit: 7/6/2023       To Whom it May Concern: Karina Michaud is under my professional care. He was seen virtually on 7/6/2023. He may return to work on 07/06/2023 . If you have any questions or concerns, please don't hesitate to call.          Sincerely,          TIFF Leigh        CC: No Recipients

## 2023-10-18 ENCOUNTER — AMB VIDEO VISIT (OUTPATIENT)
Dept: OTHER | Facility: HOSPITAL | Age: 23
End: 2023-10-18

## 2023-10-18 DIAGNOSIS — Z02.89 ENCOUNTER TO OBTAIN EXCUSE FROM WORK: Primary | ICD-10-CM

## 2023-10-18 PROBLEM — K59.00 CONSTIPATION: Status: RESOLVED | Noted: 2022-01-06 | Resolved: 2023-10-18

## 2023-10-18 PROCEDURE — ECARE PR SL URGENT CARE VIRTUAL VISIT: Performed by: PHYSICIAN ASSISTANT

## 2023-10-18 NOTE — CARE ANYWHERE EVISITS
Visit Summary for Jes Guo - Gender: Male - Date of Birth: [id number]  Date: [id number] - Duration: 5 minutes  Patient: Jes Guo  Provider: Marry Urbina PA-C    Patient Contact Information  Address  [address] [place] [address]; [address] [place] [address]  [phone]    Visit Topics  Cold [Added By: Self - 2023-10-18]    Triage Questions   What is your current physical address in the event of a medical emergency? Answer []  Are you allergic to any medications? Answer []  Are you now or could you be pregnant? Answer []  Do you have any immune system compromise or chronic lung   disease? Answer []  Do you have any vulnerable family members in the home (infant, pregnant, cancer, elderly)? Answer []     Conversation Transcripts  [0A][0A] [Notification] You are connected with Marry Urbina PA-C, Urgent Care [address] [facility] is located in [place]. [0A][Notification] Jes Guo has shared health history. Leon Redhead .[0A]    Diagnosis  Encounter for other administrative examinations    Procedures  Value: [id number] Code: CPT-4 UNLISTED E&M SERVICE    Medications Prescribed    No prescriptions ordered    Electronically signed by: Quincy Hamman, Shannon(NPI [id number])

## 2023-10-18 NOTE — LETTER
October 18, 2023     Patient: Ramona Navarro   YOB: 2000   Date of Visit: 10/18/2023       To Whom it May Concern: She Renteria is under my professional care. He was seen virtually on 10/18/2023. He may return to work on 10/19/23 . If you have any questions or concerns, please don't hesitate to call.          Sincerely,          Maru Garcia PA-C        CC: No Recipients

## 2023-10-18 NOTE — PROGRESS NOTES
Video Visit - Georgi Santiago 24 y.o. male MRN: [id number]    REQUIRED DOCUMENTATION:         1. This service was provided via [employer]. 2. Provider located at [address] [place] [id number] [phone].  3. [facility] provider: Rick Lamar PA-C.  4. Identify all parties in room with patient during [facility] visit:  No one else  5. After connecting through [employer], patient was identified by name and date of birth. Patient was then informed that this was a Telemedicine visit and that the exam was being conducted confidentially over secure lines. My office door was closed. No one else was in the room. Patient acknowledged consent and understanding of privacy and security of the Telemedicine visit. I informed the patient that I have reviewed their record in Epic and presented the opportunity for them to ask any questions regarding the visit today. The patient agreed to participate. VITALS: Heart Rate: 72 BPM, Respiratory Rate: 16 RPM, Temperature Unavailable° F, Blood Pressure Unavailable mmHg, Pulse Ox Unavailable % on RA    HPI  Reports Friday he called out of work because he had a cold- stuffy nose, sore throat. No fevers. He didn't call sooner because he didn't have work, but needs a note to return. Did not complete COVID testing. Has a new pt appt coming up in a few weeks. Physical Exam  Constitutional:       General: He is not in acute distress. Appearance: Normal appearance. He is not toxic-appearing. HENT:      Head: Normocephalic and atraumatic. Nose: No rhinorrhea. Mouth/Throat:      Mouth: Mucous membranes are moist.   Eyes:      Conjunctiva/sclera: Conjunctivae normal.   Cardiovascular:      Rate and Rhythm: Normal rate. Pulmonary:      Effort: Pulmonary effort is normal. No respiratory distress. Breath sounds: No wheezing (no gross audible wheeze through computer).    Musculoskeletal:      Cervical back: Normal range of motion. Skin:     Findings: No rash (on face or neck). Neurological:      Mental Status: He is alert. Cranial Nerves: No dysarthria or facial asymmetry. Psychiatric:         Mood and Affect: Mood normal.         Behavior: Behavior normal.       Explained I cannot provide an excuse for Friday since he wasn't seen then. Will clear him to return tomorrow. Diagnoses and all orders for this visit:    Encounter to obtain excuse from work      Patient Instructions   note in "Letters" section of [employer].  Can print if opened from a [employer] [address] Phone number is [phone] if you need assistance or have further questions

## 2023-10-18 NOTE — PATIENT INSTRUCTIONS
note in "Letters" section of [employer].  Can print if opened from a [address] Phone number is [phone] if you need assistance or have further questions

## 2024-07-22 ENCOUNTER — TELEPHONE (OUTPATIENT)
Dept: PSYCHOLOGY | Facility: CLINIC | Age: 24
End: 2024-07-22

## 2024-08-14 ENCOUNTER — AMB VIDEO VISIT (OUTPATIENT)
Dept: OTHER | Facility: HOSPITAL | Age: 24
End: 2024-08-14

## 2024-08-14 VITALS — RESPIRATION RATE: 16 BRPM | HEART RATE: 80 BPM

## 2024-08-14 DIAGNOSIS — R10.13 DYSPEPSIA: Primary | ICD-10-CM

## 2024-08-14 PROCEDURE — ECARE PR SL URGENT CARE VIRTUAL VISIT: Performed by: PHYSICIAN ASSISTANT

## 2024-08-14 NOTE — LETTER
August 14, 2024     Patient: Varun Wood  YOB: 2000  Date of Visit: 8/14/2024      To Whom it May Concern:    Varun Wood is under my professional care. Varun was seen in my office on 8/14/2024. Varun may return to work on 8/16/24 .    If you have any questions or concerns, please don't hesitate to call.         Sincerely,          Greyson Fernandez PA-C        CC: No Recipients

## 2024-08-15 ENCOUNTER — AMB VIDEO VISIT (OUTPATIENT)
Dept: OTHER | Facility: HOSPITAL | Age: 24
End: 2024-08-15

## 2024-08-15 NOTE — CARE ANYWHERE EVISITS
Visit Summary for LINETTE BHATT - Gender: Male - Date of Birth: 2000  Date: [id number] - Duration: 11 minutes  Patient: LINETTE BHATT  Provider: Greyson Fernandez PA-C    Patient Contact Information  Address  [address] [address]  [phone]    Visit Topics  Stomachache [Added By: Self - 2024-08-14]    Triage Questions   What is your current physical address in the event of a medical emergency? Answer []  Are you allergic to any medications? Answer []  Are you now or could you be pregnant? Answer []  Do you have any immune system compromise or chronic lung   disease? Answer []  Do you have any vulnerable family members in the home (infant, pregnant, cancer, elderly)? Answer []     Conversation Transcripts  [0A][0A] [Notification] You are connected with Greyson Fernandez PA-C, Urgent Care Specialist.[0A][Notification] LINETTE BHATT is located in [place].[0A][Notification] LINETTE BHATT has shared health history...[0A]    Diagnosis  Epigastric pain    Procedures  Value: [id number] Code: CPT-4 UNLISTED E&M SERVICE    Medications Prescribed    No prescriptions ordered    Electronically signed by: Greyson Fernandez PA-C(NPI [id number])

## 2024-08-15 NOTE — PROGRESS NOTES
Required Documentation:  Encounter provider: Greyson Fernandez PA-C    Identify all parties in room with patient during virtual visit:  No one else    The patient was identified by name and date of birth. Varun Wood was informed that this is a telemedicine visit and that the visit is being conducted through the [employer] platform. He agrees to proceed..  My office door was closed. No one else was in the room.  He acknowledged consent and understanding of privacy and security of the video platform. The patient has agreed to participate and understands they can discontinue the visit at any time.    Verification of patient location:  Patient is located at Home in the following state in which I hold an active license [place]    Patient is aware this is a billable service.     Reason for visit is No chief complaint on file.       Subjective  HPI   Pt woke up 2 hours ago felt nauseous, had food then has been nauseous since. He ate captain crunch. Drank it with milk. Denies any food allergy. Has not vomited. Denies any history of nausea/vomiting. No fever or chills. Reports mild LLQ abdominal pain. No recent illnesses. No alcohol or drug use. No diarrhea.     Past Medical History:   Diagnosis Date   • ADD (attention deficit disorder)        Past Surgical History:   Procedure Laterality Date   • WISDOM TOOTH EXTRACTION      2021 UNSURE OF EXACT MONTH        No Known Allergies    Review of Systems   Constitutional:  Negative for chills and fever.   Respiratory:  Negative for shortness of breath.    Cardiovascular:  Negative for chest pain and palpitations.   Gastrointestinal:  Positive for nausea. Negative for abdominal distention, abdominal pain, diarrhea and vomiting.   Genitourinary:  Negative for dysuria.   Musculoskeletal:  Negative for back pain.   All other systems reviewed and are negative.      Video Exam    Vitals:    08/14/24 2002   Pulse: 80   Resp: 16       Physical Exam  Constitutional:        General: He is not in acute distress.     Appearance: He is well-developed.   HENT:      Head: Normocephalic and atraumatic.      Right Ear: External ear normal.      Left Ear: External ear normal.      Nose: No rhinorrhea.      Mouth/Throat:      Mouth: Mucous membranes are moist.      Pharynx: No posterior oropharyngeal erythema.   Eyes:      General: No scleral icterus.     Extraocular Movements: Extraocular movements intact.   Neck:      Trachea: No tracheal deviation.   Cardiovascular:      Rate and Rhythm: Normal rate.   Pulmonary:      Effort: Pulmonary effort is normal. No respiratory distress.      Breath sounds: No stridor.   Abdominal:      General: There is no distension.      Tenderness: There is no abdominal tenderness. There is no guarding or rebound.      Comments: Abdominal exam performed by pt, directed by myself    Musculoskeletal:      Cervical back: Neck supple.   Skin:     Findings: No erythema.   Neurological:      Mental Status: He is alert.   Psychiatric:         Behavior: Behavior normal.         Visit Time  Total Visit Duration: 11 minutes    Assessment/Plan:    Diagnoses and all orders for this visit:    Dyspepsia      Pt with mild nausea and dyspepsia. Benign self abdominal exam as directed over video. No vomiting. Suspect likely related to food he had ate in the last few hours. Offered short script of zofran pt declined. Advised ED if any worsening abdominal pain fever chills etc.     There are no Patient Instructions on file for this visit.

## 2024-09-22 ENCOUNTER — AMB VIDEO VISIT (OUTPATIENT)
Dept: OTHER | Facility: HOSPITAL | Age: 24
End: 2024-09-22

## 2024-09-22 PROCEDURE — ECARE PR SL URGENT CARE VIRTUAL VISIT: Performed by: FAMILY MEDICINE

## 2024-09-22 NOTE — CARE ANYWHERE EVISITS
Visit Summary for LINETTE BHATT - Gender: Male - Date of Birth: 2000  Date: [id number] - Duration: 3 minutes  Patient: LINETTE BHATT  Provider: Gonzalez Darby    Patient Contact Information  Address  [address]  [place]; [place] [address]  [phone]    Visit Topics  Nausea, sore throat, stuffy nose, headache, dry eyes [Added By: Self - 2024-09-22]    Sick Slip  Reason [ILLNESS]. Remarks [Patient seen today. Please excuse patient from work today September 21st 2024 as patient is currently under our care.Gonzalez Darby DO].    Triage Questions   What is your current physical address in the event of a medical emergency? Answer []  Are you allergic to any medications? Answer []  Are you now or could you be pregnant? Answer []  Do you have any immune system compromise or chronic lung   disease? Answer []  Do you have any vulnerable family members in the home (infant, pregnant, cancer, elderly)? Answer []     Conversation Transcripts  [0A][0A] [Notification] Hermilo Black, Global Staff, will help you prepare for your visit. She is assisting Gonzalez Darby, Adult Medicine.[0A][Hermilo Black] Hello, and thank you for connecting! Unfortunately, the provider you are waiting to   connect with is no longer available at this time. I will transfer you now to another qualified provider who will be able to assist you shortly. Thank you for your patience.[0A][Notification] Hermilo Black has left the room.[0A][Notification] You are   connected with Gonzalez Darby, Adult Medicine.[0A][Notification] LINETTE BHATT is located in [place].[0A][Notification] LINETTE BHATT has shared health history...[0A][Notification] Gonzalez Darby has added a prescription.[0A][Notification]   Gonzalez Darby has added a prescription.[0A][Notification] Gonzalez Darby has added a prescription.[0A][Notification] Gonzalez Darby has added a note.[0A][Notification] Gonzalez Darby has added a diagnosis/procedure  code.[0A][Notification] Gonzalez Darby   has issued a sick slip.[0A]    Diagnosis  Acute frontal sinusitis, unspecified    Procedures    Medications Prescribed    fluticasone propionate  Dose : 1 spray  Route : nasal  Frequency : 2 times a day. Until directed to stop.     Refills : 0  Instructions to the Pharmacist : Substitutions allowed    benzonatate  Dose : 1 capsule  Route : oral  Frequency : 3 times a day. Until directed to stop.     Refills : 0  Instructions to the Pharmacist : Substitutions allowed    amoxicillin-pot clavulanate  Dose : 1 tablet  Route : oral  Frequency : every 12 hours.     Refills : 0  Instructions to the Pharmacist : Substitutions allowed      Provider Notes  [0A][0A] Mode of Communication: videoHistory: The patient has had cold symptoms and sinus pressure for 10 days.[0A]The symptoms are now worsening. Recent changes include fever .[0A]The patient is also experiencing cough which has been present for 4   days.[0A]Additional pertinent positives: none[0A]Pertinent negatives: nonePast medical history: none[0A]Medications: none[0A]Allergies: nkdaExam:[0A]Vitals signs (if available):[0A]Weight:[0A]General:well appearing[0A]Eyes:[0A]Nose:[0A]Sinuses:   tenderness to sinuses b/l[0A]Neck:[0A]Respiratory:[0A]Other:Assessment: Acute sinusitis[0A]Diagnosis code: J01.90 Acute sinusitis, unspecifiedPlan:1.     Medications:a.     augmentin is first line for sinusitis2.     Home care:a.     Acetaminophen or   ibuprofen as needed for pain or feverb.     May also use OTC throat sprays, gargle, or lozenges3.     Referral or follow up:a.     As needed for worsening or if no improvement in 3 days4.     Medical decision making:a.     Reason for treating (Severe,   persistent, worsening):5.     Antibiotic choice (if applicable):Additional recommendations:1.     If you received a prescription at this visit and you have a question or problem please call [phone] for prescription assistance2.     Please  print a   copy of this note and send it to your regular doctor, or take it to your next visit so it may be included in your medical record3.     Please see your primary care provider on an annual basis or more frequently if directedThe patient voiced understanding   and agreement with plan.  [0A]    Electronically signed by: Gonzalez Darby(NPI [id number])

## 2024-09-23 ENCOUNTER — AMB VIDEO VISIT (OUTPATIENT)
Dept: OTHER | Facility: HOSPITAL | Age: 24
End: 2024-09-23

## 2024-09-23 NOTE — CARE ANYWHERE EVISITS
Visit Summary for LINETTE BHATT - Gender: Male - Date of Birth: 2000  Date: [id number] - Duration: 3 minutes  Patient: LINETTE BHATT  Provider: Atul Palomo    Patient Contact Information  Address  [address]  [place]; [place] [address]  [phone]    Visit Topics    Sick Slip  Reason [ILLNESS]. Remarks [The patient was seen today, 09/22/2024. The patient may return to school/work on 09/23/2024.].    Triage Questions   What is your current physical address in the event of a medical emergency? Answer []  Are you allergic to any medications? Answer []  Are you now or could you be pregnant? Answer []  Do you have any immune system compromise or chronic lung   disease? Answer []  Do you have any vulnerable family members in the home (infant, pregnant, cancer, elderly)? Answer []     Conversation Transcripts  [0A][0A] [Notification] You are connected with Atul Palomo, Family Physician.[0A][Notification] LINETTE BHATT is located in [place].[0A][Notification] LINETTE BHATT has shared health history...[0A][Notification] Atul Palomo has added a   diagnosis/procedure code.[0A][Notification] Atul Palomo has issued a sick slip.[0A]    Diagnosis  Acute sinusitis, unspecified    Procedures    Medications Prescribed    No prescriptions ordered    Provider Notes  [0A][0A] We strongly encourage you to share the following record of today's visit with your primary care physician.[0A] [0A]Mode of Communication:  Video[0A] [0A]HPI: Pt presents with ongoing cold and sinusitis symptoms. Pt was seen yesterday and started   on Augmetin. Still feeling bad today and needs a work note.[0A] [0A]PMH: depression[0A]Meds: Bupropion, aripirazole[0A]Allergies: NKDA[0A] [0A]Exam:  [0A]Gen: Alert, normal mental status and interaction, NAD[0A]Eye: PERRLA. No discharge[0A]Resp: No   acute resp distress[0A] [0A]Assessment: Sinusitis[0A]Still feeling bad.[0A] [0A]Plan: Work note today.[0A]Discussed precautions.[0A]  [0A]Follow up:[0A]1.        If there are any questions or problems with the prescription, call [phone] anytime for   assistance.[0A]2.        Please re-connect for another online visit or see an in-person provider should your symptoms worsen or persist.[0A]3.        Taking a probiotic (either in pill form or by eating yogurt that contains probiotics) while using   antibiotics can help prevent some of the troublesome side effects that antibiotics can sometimes cause.[0A]4.        Please print a copy of this note and send it to your regular doctor, or take it to your next visit so it may be included in your medical   record.[0A] [0A]Patient voiced understanding and agrees to plan.[0A] [0A]Please see your PCP on an annual basis.[0A][0A]    Electronically signed by: Atul Palomo(NPI [id number])

## 2024-10-29 ENCOUNTER — OFFICE VISIT (OUTPATIENT)
Dept: URGENT CARE | Age: 24
End: 2024-10-29
Payer: COMMERCIAL

## 2024-10-29 VITALS
BODY MASS INDEX: 20.73 KG/M2 | RESPIRATION RATE: 20 BRPM | OXYGEN SATURATION: 97 % | WEIGHT: 140 LBS | HEART RATE: 78 BPM | HEIGHT: 69 IN | TEMPERATURE: 98.3 F

## 2024-10-29 DIAGNOSIS — J06.9 VIRAL URI WITH COUGH: Primary | ICD-10-CM

## 2024-10-29 PROCEDURE — G0382 LEV 3 HOSP TYPE B ED VISIT: HCPCS | Performed by: STUDENT IN AN ORGANIZED HEALTH CARE EDUCATION/TRAINING PROGRAM

## 2024-10-29 PROCEDURE — S9083 URGENT CARE CENTER GLOBAL: HCPCS | Performed by: STUDENT IN AN ORGANIZED HEALTH CARE EDUCATION/TRAINING PROGRAM

## 2024-10-29 NOTE — LETTER
October 29, 2024     Patient: Varun Wood   YOB: 2000   Date of Visit: 10/29/2024       To Whom it May Concern:    Varun Wood was seen in my clinic on 10/29/2024. Please excuse him form work today and tomorrow 10/30/24.     If you have any questions or concerns, please don't hesitate to call.         Sincerely,          Mariana Henry, DO

## 2024-10-30 NOTE — PROGRESS NOTES
[facility]        NAME: Varun Wood is a 24 y.o. male  : 2000    MRN: [id number]  DATE: 2024  TIME: 8:49 PM    Assessment and Plan   Viral URI with cough [J06.9]  1. Viral URI with cough        Mild symptoms, continue with supportive care.  Provided with work note.      Patient Instructions       Follow up with PCP in 3-5 days.  Proceed to  ER if symptoms worsen.    If tests have been performed at [facility], our office will contact you with results if changes need to be made to the care plan discussed with you at the visit.  You can review your full results on [facility].    Chief Complaint     Chief Complaint   Patient presents with    Cold Like Symptoms     Cough and sorethroat since Saturday... worsened by smoke at work... stuffy nose         History of Present Illness       Patient presents for 3 to 4 days of stuffy nose, runny nose, sore throat, cough.  No wheezing, shortness of breath.  No fevers, chills.  Girlfriend with similar symptoms and now recovering.  Has not taken any OTC medication so far.        Review of Systems   Review of Systems   All other systems reviewed and are negative.        Current Medications       Current Outpatient Medications:     amphetamine-dextroamphetamine (ADDERALL) 20 mg tablet, Take 1 tablet by mouth 2 (two) times a day, Disp: , Rfl:     amphetamine-dextroamphetamine (ADDERALL, 10MG,) 10 mg tablet, Take 10 mg by mouth 2 (two) times a day, Disp: , Rfl:     buPROPion (WELLBUTRIN SR) 100 mg 12 hr tablet, Take 100 mg by mouth every morning, Disp: , Rfl:     citalopram (CeleXA) 10 mg tablet, Take 10 mg by mouth daily   (Patient not taking: Reported on 10/29/2024), Disp: , Rfl:     Current Allergies     Allergies as of 10/29/2024    (No Known Allergies)            The following portions of the patient's history were reviewed and updated as appropriate: allergies, current medications, past family history, past medical history, past social  "history, past surgical history and problem list.     Past Medical History:   Diagnosis Date    ADD (attention deficit disorder)        Past Surgical History:   Procedure Laterality Date    WISDOM TOOTH EXTRACTION      2021 UNSURE OF EXACT MONTH       Family History   Family history unknown: Yes         Medications have been verified.        Objective   Pulse 78   Temp 98.3 °F (36.8 °C)   Resp 20   Ht 5' 9\" (1.753 m)   Wt 63.5 kg (140 lb)   SpO2 97%   BMI 20.67 kg/m²   No LMP for male patient.       Physical Exam     Physical Exam  Constitutional:       General: He is not in acute distress.     Appearance: Normal appearance. He is not ill-appearing or toxic-appearing.   HENT:      Head: Normocephalic and atraumatic.      Right Ear: External ear normal.      Left Ear: External ear normal.      Nose: Congestion present.      Mouth/Throat:      Mouth: Mucous membranes are moist.      Pharynx: No oropharyngeal exudate or posterior oropharyngeal erythema.   Eyes:      Extraocular Movements: Extraocular movements intact.   Cardiovascular:      Rate and Rhythm: Normal rate and regular rhythm.      Heart sounds: Normal heart sounds.   Pulmonary:      Effort: Pulmonary effort is normal. No respiratory distress.      Breath sounds: Normal breath sounds. No stridor. No wheezing, rhonchi or rales.      Comments: + Cough  Lungs clear bilaterally  Skin:     General: Skin is warm and dry.      Capillary Refill: Capillary refill takes less than 2 seconds.      Findings: No rash.   Neurological:      Mental Status: He is alert.      Gait: Gait normal.   Psychiatric:         Behavior: Behavior normal.                     "

## 2025-01-04 ENCOUNTER — OFFICE VISIT (OUTPATIENT)
Dept: URGENT CARE | Age: 25
End: 2025-01-04
Payer: COMMERCIAL

## 2025-01-04 VITALS
HEART RATE: 81 BPM | DIASTOLIC BLOOD PRESSURE: 79 MMHG | TEMPERATURE: 97.9 F | OXYGEN SATURATION: 98 % | SYSTOLIC BLOOD PRESSURE: 114 MMHG | RESPIRATION RATE: 16 BRPM

## 2025-01-04 DIAGNOSIS — J06.9 UPPER RESPIRATORY TRACT INFECTION, UNSPECIFIED TYPE: Primary | ICD-10-CM

## 2025-01-04 PROCEDURE — G0383 LEV 4 HOSP TYPE B ED VISIT: HCPCS

## 2025-01-04 PROCEDURE — S9083 URGENT CARE CENTER GLOBAL: HCPCS

## 2025-01-04 NOTE — LETTER
January 4, 2025     Patient: Varun Wood   YOB: 2000   Date of Visit: 1/4/2025       To Whom it May Concern:    Varun Wood was seen in my clinic on 1/4/2025. He may return to work on 1/6/2025 .    If you have any questions or concerns, please don't hesitate to call.         Sincerely,          TIFF Zhao        CC: No Recipients

## 2025-01-05 NOTE — PROGRESS NOTES
[facility]        NAME: Varun Wood is a 24 y.o. male  : 2000    MRN: [id number]  DATE: 2025  TIME: 7:15 PM    Assessment and Plan   Upper respiratory tract infection, unspecified type [J06.9]  1. Upper respiratory tract infection, unspecified type          Patient is afebrile and exam was benign except for some mild bilateral erythematous and edematous nasal turbinates. No sinus tenderness, tonsillar exudate or cervical lymphadenopathy noted. No abx therapy indicated at this time. Counseled patient on supportive therapy with plenty of fluids, tea with honey, throat lozenges. Can use saline nasal irrigation.  May trial Flonase for congestion and inflammation. Tylenol or Motrin as needed for pain.May use decongestants as directed.      Patient Instructions       Follow up with PCP in 3-5 days.  Proceed to  ER if symptoms worsen.    If tests have been performed at [facility], our office will contact you with results if changes need to be made to the care plan discussed with you at the visit.  [facility]    Chief Complaint     Chief Complaint   Patient presents with   • Cold Like Symptoms     Started with post nasal drip and fatigue started yesterday . Denies fevers. Requesting a note for work    • Fatigue         History of Present Illness       Is a 24-year-old male presenting with 1 day of fatigue, postnasal drip.  He reports his girlfriend is sick with similar symptoms.  He denies fever, chills, nausea vomiting diarrhea, no chest pain, shortness of breath or difficulty breathing.  He has taken Sudafed with moderate relief of his symptoms.  Patient requesting work note, missed work today due to his symptoms.    Fatigue  Associated symptoms include fatigue. Pertinent negatives include no chest pain, congestion, coughing, fever or sore throat.       Review of Systems   Review of Systems   Constitutional:  Positive for fatigue. Negative  for fever.   HENT:  Positive for postnasal drip. Negative for congestion, sneezing and sore throat.    Respiratory:  Negative for cough, shortness of breath and wheezing.    Cardiovascular:  Negative for chest pain and palpitations.         Current Medications       Current Outpatient Medications:   •  amphetamine-dextroamphetamine (ADDERALL) 20 mg tablet, Take 1 tablet by mouth 2 (two) times a day, Disp: , Rfl:   •  amphetamine-dextroamphetamine (ADDERALL, 10MG,) 10 mg tablet, Take 10 mg by mouth 2 (two) times a day, Disp: , Rfl:   •  buPROPion (WELLBUTRIN SR) 100 mg 12 hr tablet, Take 100 mg by mouth every morning (Patient not taking: Reported on 1/4/2025), Disp: , Rfl:   •  citalopram (CeleXA) 10 mg tablet, Take 10 mg by mouth daily   (Patient not taking: Reported on 1/4/2025), Disp: , Rfl:     Current Allergies     Allergies as of 01/04/2025   • (No Known Allergies)            The following portions of the patient's history were reviewed and updated as appropriate: allergies, current medications, past family history, past medical history, past social history, past surgical history and problem list.     Past Medical History:   Diagnosis Date   • ADD (attention deficit disorder)        Past Surgical History:   Procedure Laterality Date   • WISDOM TOOTH EXTRACTION      2021 UNSURE OF EXACT MONTH       Family History   Family history unknown: Yes         Medications have been verified.        Objective   /79 (Patient Position: Sitting, Cuff Size: Adult)   Pulse 81   Temp 97.9 °F (36.6 °C) (Temporal)   Resp 16   SpO2 98%   No LMP for male patient.       Physical Exam     Physical Exam  Vitals and nursing note reviewed.   Constitutional:       General: He is not in acute distress.     Appearance: Normal appearance. He is normal weight. He is not ill-appearing.   HENT:      Head: Normocephalic.      Right Ear: Tympanic membrane normal.      Left Ear: Tympanic membrane normal.      Nose: Congestion and  rhinorrhea present.      Mouth/Throat:      Mouth: Mucous membranes are moist.      Pharynx: Posterior oropharyngeal erythema present.   Eyes:      Extraocular Movements: Extraocular movements intact.   Cardiovascular:      Rate and Rhythm: Normal rate and regular rhythm.      Pulses: Normal pulses.   Pulmonary:      Effort: Pulmonary effort is normal. No respiratory distress.      Breath sounds: Normal breath sounds. No stridor. No wheezing, rhonchi or rales.   Chest:      Chest wall: No tenderness.   Musculoskeletal:      Cervical back: Normal range of motion.   Lymphadenopathy:      Cervical: No cervical adenopathy.   Skin:     General: Skin is warm.      Capillary Refill: Capillary refill takes less than 2 seconds.   Neurological:      Mental Status: He is alert.

## 2025-01-07 ENCOUNTER — OFFICE VISIT (OUTPATIENT)
Dept: URGENT CARE | Age: 25
End: 2025-01-07
Payer: COMMERCIAL

## 2025-01-07 VITALS
TEMPERATURE: 98.4 F | RESPIRATION RATE: 16 BRPM | HEART RATE: 78 BPM | HEIGHT: 69 IN | SYSTOLIC BLOOD PRESSURE: 123 MMHG | BODY MASS INDEX: 19.99 KG/M2 | DIASTOLIC BLOOD PRESSURE: 77 MMHG | WEIGHT: 135 LBS | OXYGEN SATURATION: 97 %

## 2025-01-07 DIAGNOSIS — J06.9 ACUTE URI: Primary | ICD-10-CM

## 2025-01-07 PROCEDURE — S9083 URGENT CARE CENTER GLOBAL: HCPCS | Performed by: PHYSICIAN ASSISTANT

## 2025-01-07 PROCEDURE — G0382 LEV 3 HOSP TYPE B ED VISIT: HCPCS | Performed by: PHYSICIAN ASSISTANT

## 2025-01-07 RX ORDER — METHYLPREDNISOLONE 4 MG/1
TABLET ORAL
Qty: 1 EACH | Refills: 0 | Status: SHIPPED | OUTPATIENT
Start: 2025-01-07

## 2025-01-07 NOTE — LETTER
January 7, 2025     Patient: Varun Wood   YOB: 2000   Date of Visit: 1/7/2025       To Whom it May Concern:    Varun Wood was seen in my clinic on 1/7/2025. He may return back to work once fever free for 24 hours.    If you have any questions or concerns, please don't hesitate to call.         Sincerely,          Padmaja Platt PA-C        CC: No Recipients

## 2025-01-08 NOTE — PROGRESS NOTES
[facility]        NAME: Varun Wood is a 24 y.o. male  : 2000    MRN: [id number]  DATE: 2025  TIME: 8:34 PM    Assessment and Plan   Acute URI [J06.9]  1. Acute URI  methylPREDNISolone 4 MG tablet therapy pack            Patient Instructions   Patient was educated on URI. Patient was educated on steroids. Do not take steroids with anti-inflammatories. Any chest pain or shortness of breath go to ED.     Follow up with PCP in 3-5 days.  Proceed to  ER if symptoms worsen.    If tests have been performed at [facility], our office will contact you with results if changes need to be made to the care plan discussed with you at the visit.  [facility]    Chief Complaint     Chief Complaint   Patient presents with    Cold Like Symptoms     Per patient, his symptoms started about 4 days ago. He needs a work note.          History of Present Illness       Patient is here today complaining of congestion and sore throat for 3-4 days. Denies any history of asthma or diabetes. Denies any chest pain or shortness of breath. Denies any allergies to medications. Patient reports he just needs a work note.        Review of Systems   Review of Systems   Constitutional: Negative.    HENT:  Positive for congestion and sore throat.    Respiratory: Negative.     Cardiovascular: Negative.    Neurological: Negative.    Psychiatric/Behavioral: Negative.           Current Medications       Current Outpatient Medications:     amphetamine-dextroamphetamine (ADDERALL, 10MG,) 10 mg tablet, Take 10 mg by mouth 2 (two) times a day (Patient taking differently: Take 10 mg by mouth daily), Disp: , Rfl:     buPROPion (WELLBUTRIN SR) 100 mg 12 hr tablet, Take 100 mg by mouth every morning, Disp: , Rfl:     methylPREDNISolone 4 MG tablet therapy pack, Use as directed on package, Disp: 1 each, Rfl: 0    amphetamine-dextroamphetamine (ADDERALL) 20 mg tablet, Take 1 tablet by mouth  "2 (two) times a day (Patient not taking: Reported on 1/7/2025), Disp: , Rfl:     citalopram (CeleXA) 10 mg tablet, Take 10 mg by mouth daily   (Patient not taking: Reported on 10/29/2024), Disp: , Rfl:     Current Allergies     Allergies as of 01/07/2025    (No Known Allergies)            The following portions of the patient's history were reviewed and updated as appropriate: allergies, current medications, past family history, past medical history, past social history, past surgical history and problem list.     Past Medical History:   Diagnosis Date    ADD (attention deficit disorder)        Past Surgical History:   Procedure Laterality Date    WISDOM TOOTH EXTRACTION      2021 UNSURE OF EXACT MONTH       Family History   Family history unknown: Yes         Medications have been verified.        Objective   /77 (BP Location: Left arm, Patient Position: Sitting)   Pulse 78   Temp 98.4 °F (36.9 °C) (Oral)   Resp 16   Ht 5' 9\" (1.753 m)   Wt 61.2 kg (135 lb)   SpO2 97%   BMI 19.94 kg/m²   No LMP for male patient.       Physical Exam     Physical Exam  Vitals and nursing note reviewed.   Constitutional:       Appearance: Normal appearance.   HENT:      Head: Normocephalic.      Comments: Pressure over frontal or maxillary sinus      Right Ear: Tympanic membrane, ear canal and external ear normal.      Left Ear: Tympanic membrane, ear canal and external ear normal.      Mouth/Throat:      Pharynx: Posterior oropharyngeal erythema present.   Eyes:      Extraocular Movements: Extraocular movements intact.      Pupils: Pupils are equal, round, and reactive to light.   Cardiovascular:      Rate and Rhythm: Normal rate and regular rhythm.      Heart sounds: Normal heart sounds.   Pulmonary:      Breath sounds: Normal breath sounds.   Neurological:      Mental Status: He is alert and oriented to person, place, and time.   Psychiatric:         Mood and Affect: Mood normal.         Behavior: Behavior normal. "

## 2025-01-08 NOTE — PATIENT INSTRUCTIONS
Patient was educated on URI. Patient was educated on steroids. Do not take steroids with anti-inflammatories. Any chest pain or shortness of breath go to ED.

## 2025-01-29 ENCOUNTER — OFFICE VISIT (OUTPATIENT)
Dept: URGENT CARE | Age: 25
End: 2025-01-29
Payer: COMMERCIAL

## 2025-01-29 VITALS
SYSTOLIC BLOOD PRESSURE: 118 MMHG | TEMPERATURE: 95.9 F | OXYGEN SATURATION: 96 % | DIASTOLIC BLOOD PRESSURE: 84 MMHG | HEART RATE: 75 BPM | WEIGHT: 134.9 LBS | BODY MASS INDEX: 19.92 KG/M2 | RESPIRATION RATE: 20 BRPM

## 2025-01-29 DIAGNOSIS — R10.9 STOMACH PAIN: Primary | ICD-10-CM

## 2025-01-29 PROCEDURE — G0382 LEV 3 HOSP TYPE B ED VISIT: HCPCS | Performed by: PHYSICIAN ASSISTANT

## 2025-01-29 PROCEDURE — S9083 URGENT CARE CENTER GLOBAL: HCPCS | Performed by: PHYSICIAN ASSISTANT

## 2025-01-29 NOTE — LETTER
January 29, 2025     Patient: Varun Wood   YOB: 2000   Date of Visit: 1/29/2025       To Whom It May Concern:    It is my medical opinion that Varun Wood may return to work on 1/30/25 .    If you have any questions or concerns, please don't hesitate to call.         Sincerely,        Greyson Fernandez PA-C    CC: No Recipients

## 2025-01-30 NOTE — PROGRESS NOTES
[facility]        NAME: Varun Wood is a 24 y.o. male  : 2000    MRN: [id number]  DATE: 2025  TIME: 8:24 PM    Assessment and Plan   Stomach pain [R10.9]  1. Stomach pain            Patient had some pain after eating a large fast food meal and subsequent gastritis which has spontaneously resolved today.  He may return to work tomorrow      The patient and/or parent/legal guardian verbalized understanding of exam findings and   Treatment plan. We engaged in the shared decision-making process and treatment options were   discussed at length with the patient.  All questions, concerns and complaints were answered and   addressed to the patient's' and/or parent/legal guardians's satisfaction.    Patient Instructions   There are no Patient Instructions on file for this visit.    Follow up with PCP in 3-5 days.  Proceed to  ER if symptoms worsen.    If tests are performed, our office will contact you with results only if   changes need to made to the care plan discussed with you at the visit.   [facility]     Chief Complaint     Chief Complaint   Patient presents with   • Abdominal Pain     Abdominal pain for 1 day half.         History of Present Illness       HPI  Pt reports for 1 and a half days he feels that he hasn't eaten much the last few days. Attributes lack of appetite to medications. Last night ate arbys, felt unwell. Say symptoms have improved since then.     Review of Systems   Review of Systems  All other related systems reviewed with patient or accompanying historian and are negative except as noted in HPI    Current Medications       Current Outpatient Medications:   •  amphetamine-dextroamphetamine (ADDERALL) 20 mg tablet, Take 1 tablet by mouth 2 (two) times a day, Disp: , Rfl:   •  buPROPion (WELLBUTRIN SR) 100 mg 12 hr tablet, Take 100 mg by mouth every morning, Disp: , Rfl:   •  amphetamine-dextroamphetamine (ADDERALL,  10MG,) 10 mg tablet, Take 10 mg by mouth 2 (two) times a day (Patient taking differently: Take 10 mg by mouth daily), Disp: , Rfl:   •  citalopram (CeleXA) 10 mg tablet, Take 10 mg by mouth daily   (Patient not taking: Reported on 1/29/2025), Disp: , Rfl:   •  methylPREDNISolone 4 MG tablet therapy pack, Use as directed on package (Patient not taking: Reported on 1/29/2025), Disp: 1 each, Rfl: 0    Current Allergies     Allergies as of 01/29/2025   • (No Known Allergies)            The following portions of the patient's history were reviewed and updated as appropriate: allergies, current medications, past family history, past medical history, past social history, past surgical history and problem list.     Past Medical History:   Diagnosis Date   • ADD (attention deficit disorder)        Past Surgical History:   Procedure Laterality Date   • WISDOM TOOTH EXTRACTION      2021 UNSURE OF EXACT MONTH       Family History   Family history unknown: Yes         Medications have been verified.        Objective   /84   Pulse 75   Temp (!) 95.9 °F (35.5 °C) (Tympanic)   Resp 20   Wt 61.2 kg (134 lb 14.4 oz)   SpO2 96%   BMI 19.92 kg/m²   No LMP for male patient.       Physical Exam     Physical Exam  Constitutional:       General: He is not in acute distress.     Appearance: He is well-developed.   HENT:      Head: Normocephalic and atraumatic.   Eyes:      General: No scleral icterus.     Conjunctiva/sclera: Conjunctivae normal.   Pulmonary:      Effort: Pulmonary effort is normal. No respiratory distress.      Breath sounds: No stridor.   Abdominal:      General: Abdomen is flat. Bowel sounds are normal. There is no distension. There are no signs of injury.      Palpations: Abdomen is soft.      Tenderness: There is no abdominal tenderness.   Musculoskeletal:      Cervical back: Normal range of motion and neck supple.   Skin:     General: Skin is warm and dry.   Neurological:      Mental Status: He is alert and  "oriented to person, place, and time.   Psychiatric:         Behavior: Behavior normal.         Ortho Exam        Procedures  No Procedures performed today        Note: Portions of this record may have been created with voice recognition software. Occasional wrong word or \"sound a like\" substitutions may have occurred due to the inherent limitations of voice recognition software. Please read the chart carefully and recognize, using context, where substitutions have occurred.*      "

## 2025-04-15 ENCOUNTER — TELEMEDICINE (OUTPATIENT)
Dept: OTHER | Facility: HOSPITAL | Age: 25
End: 2025-04-15
Payer: COMMERCIAL

## 2025-04-15 DIAGNOSIS — J00 ACUTE NASOPHARYNGITIS: Primary | ICD-10-CM

## 2025-04-15 PROCEDURE — 99213 OFFICE O/P EST LOW 20 MIN: CPT | Performed by: PHYSICIAN ASSISTANT

## 2025-04-15 NOTE — PATIENT INSTRUCTIONS
"[facility] Phone number is [phone] if you need assistance or have further questions  Notes can be found in \"Letters\" section of [employer]    You likely have a virus such as the flu, covid or a cold. Please schedule a follow-up with your PCP within the next 2 days for recheck. Go to the ER for new worsening or concerning symptoms including but not limited to, dehydration, shortness of breath or chest pain    You can have fever for 3-5 days with a viral illness. Need re-evaluation for fevers >5 days. Additional symptoms that develop (congestion, cough, nausea, diarrhea) can last 7-10 days.      Stay out of work/school until fever free for 24 hours without use of tylenol or motrin     Make sure you have a thermometer and if you feel chills or sweats check it and write it down.  Take Tylenol (acetaminophen) and Motrin (ibuprofen) for fevers, body aches, and headaches. Alternate Motrin and Tylenol every 3 hours for more consistent relief.     Drink plenty of fluids to stay well hydrated- at least 2 L per day for adults  Water, hot water with lemon or honey, warm tea.   Can drink Pedialyte, Gatorade/Powerade zero, but should mix with water.      For diarrhea, vomiting and abdominal pain   Milk or juice can make diarrhea worse.  follow \"BRAT\" diet Bananas, Rice, Applesauce, Toast (also plain pasta or crackers)  Small frequent meals   Allow diarrhea to run its course. Most cases will resolved in 3-5 days     Use over-the-counter saline nasal spray/allergy medication for congestion, runny nose, and postnasal drip  Mucinex (guaifenesin) helps to thin and loosen mucous.   Claritin, Zyrtec, Xyzal, or Allegra are non-drowsy antihistamines- helps dry out mucous (will make mucous darker)  Delsym or robitussin (dextromethorphan) is a cough suppressant.  Oral decongestants- pseudoephedrine behind the counter pill helps with nasal and sinus congestion  Nasal Decongestants- phenylephrine or fluticasone nasal spray " (oxymetazoline up to 3 days)  Vicks to the front/back of the chest bottom of the feet with socks     For sore throat relief  Warm salt water gargles, warm tea with honey as needed  Cool mist humidifier at bedside- helps keep airway moist  Chloraseptic spray or throat lozenges with benzocaine (cepacol max strength).

## 2025-04-15 NOTE — PROGRESS NOTES
Virtual Regular Visit  Name: Varun Wood      : 2000      MRN: [id number]  Encounter Provider: Shannon D Severino, PA-C  Encounter Date: 4/15/2025   Encounter department: VIRTUAL CARE       Verification of patient location:  Patient is located at Home in the following state in which I hold an active license [place] :  Assessment & Plan  Acute nasopharyngitis             Encounter provider Shannon D Severino, PA-C    The patient was identified by name and date of birth. Varun Wood was informed that this is a telemedicine visit and that the visit is being conducted through the Epic Embedded platform. He agrees to proceed..  My office door was closed. No one else was in the room. He acknowledged consent and understanding of privacy and security of the video platform. The patient has agreed to participate and understands they can discontinue the visit at any time.    Patient is aware this is a billable service.     History obtained from: patient  History of Present Illness     Pt reports he needs a note for work. He has a cold- rhinorrhea, congestion sore throat from PND. Also has abdominal pain A few inches to the left of abdomen. Took some afrin with relief. No fevers, NVD. Works in a [employer], so multiple sick contacts.       Review of Systems   Constitutional:  Negative for fever.   HENT:  Positive for congestion, sinus pressure, sinus pain and sore throat. Negative for nosebleeds.    Eyes:  Negative for redness.   Respiratory:  Negative for cough and shortness of breath.    Cardiovascular:  Negative for chest pain.   Gastrointestinal:  Positive for abdominal pain. Negative for blood in stool.   Genitourinary:  Negative for hematuria.   Musculoskeletal:  Negative for gait problem.   Skin:  Negative for rash.   Neurological:  Negative for seizures and headaches.   Psychiatric/Behavioral:  Negative for behavioral problems.        Objective   There were no vitals taken for this visit.    Physical  Exam  Constitutional:       General: He is not in acute distress.     Appearance: Normal appearance. He is not toxic-appearing.   HENT:      Head: Normocephalic and atraumatic.      Nose: No rhinorrhea.      Comments: Sounds mildly congested     Mouth/Throat:      Mouth: Mucous membranes are moist.   Eyes:      Conjunctiva/sclera: Conjunctivae normal.   Pulmonary:      Effort: Pulmonary effort is normal. No respiratory distress.      Breath sounds: No wheezing (no gross audible wheeze through computer).   Musculoskeletal:      Cervical back: Normal range of motion.   Skin:     Findings: No rash (on face or neck).   Neurological:      Mental Status: He is alert.      Cranial Nerves: No dysarthria or facial asymmetry.   Psychiatric:         Mood and Affect: Mood normal.         Behavior: Behavior normal.         Visit Time  Total Visit Duration: 6 minutes not including the time spent for establishing the audio/video connection.

## 2025-04-15 NOTE — LETTER
April 15, 2025     Patient: Varun Wood   YOB: 2000   Date of Visit: 4/15/2025       To Whom it May Concern:    Varun Wood is under my professional care. He was seen at the above location on 4/15/2025. He may return to work on 4/16/25 .    If you have any questions or concerns, please don't hesitate to call.         Sincerely,          Shannon D Severino, PA-C        CC: No Recipients

## 2025-05-07 ENCOUNTER — TELEMEDICINE (OUTPATIENT)
Dept: OTHER | Facility: HOSPITAL | Age: 25
End: 2025-05-07
Payer: COMMERCIAL

## 2025-05-07 DIAGNOSIS — B34.9 VIRAL ILLNESS: Primary | ICD-10-CM

## 2025-05-07 PROCEDURE — 99213 OFFICE O/P EST LOW 20 MIN: CPT | Performed by: PHYSICIAN ASSISTANT

## 2025-05-07 NOTE — LETTER
May 7, 2025     Patient: Varun Wood  YOB: 2000  Date of Visit: 5/7/2025      To Whom it May Concern:    Varun Wood is under my professional care. Varun was seen in my office on 5/7/2025. Please excuse him from work on 5/7 Varun may return to work on 5/8/25 .    If you have any questions or concerns, please don't hesitate to call.         Sincerely,          Stephani Orellana PA-C        CC: No Recipients

## 2025-05-08 NOTE — PROGRESS NOTES
Virtual Regular VisitName: Varun Wood      : 2000      MRN: [id number]  Encounter Provider: Stephani Orellana PA-C  Encounter Date: 2025   Encounter department: VIRTUAL CARE   :  Assessment & Plan  Viral illness             History of Present Illness     Patient states he has a sore throat and PND. He needs a note for work. He started last yesterday. His girlfriend was just sick. He denies any fevers, SOB, CP.        Review of Systems   Constitutional: Negative.    HENT:  Positive for postnasal drip and sore throat.    Respiratory: Negative.     Musculoskeletal: Negative.    Neurological: Negative.    Psychiatric/Behavioral: Negative.         Objective   There were no vitals taken for this visit.    Physical Exam  Constitutional:       General: He is not in acute distress.     Appearance: Normal appearance. He is not ill-appearing, toxic-appearing or diaphoretic.   HENT:      Head: Normocephalic and atraumatic.   Pulmonary:      Effort: Pulmonary effort is normal.   Lymphadenopathy:      Cervical: No cervical adenopathy.   Skin:     General: Skin is dry.   Neurological:      General: No focal deficit present.      Mental Status: He is alert and oriented to person, place, and time.   Psychiatric:         Mood and Affect: Mood normal.         Behavior: Behavior normal.         Administrative Statements   Encounter provider Stephani Orellana PA-C    The Patient is located at Home and in the following state in which I [place] an [place] license [place].    The patient was identified by name and date of birth. Varun Wood was informed that this is a telemedicine visit and that the visit is being conducted through the Epic Embedded platform. He agrees to proceed..  My office door was closed. No one else was in the room.  He acknowledged consent and understanding of privacy and security of the video platform. The patient has agreed to participate and understands they can discontinue the visit at any  time.    I have spent a total time of 5 minutes in caring for this patient on the day of the visit/encounter including Documenting in the medical record, Reviewing/placing orders in the medical record (including tests, medications, and/or procedures), and Obtaining or reviewing history  , not including the time spent for establishing the audio/video connection.

## 2025-05-08 NOTE — PATIENT INSTRUCTIONS
Likely Viral Illness at this time  Continue with over the counter medications   Follow up with PCP  ER if worsen

## 2025-05-12 ENCOUNTER — TELEMEDICINE (OUTPATIENT)
Dept: OTHER | Facility: HOSPITAL | Age: 25
End: 2025-05-12
Payer: COMMERCIAL

## 2025-05-12 DIAGNOSIS — Z02.89 ENCOUNTER TO OBTAIN EXCUSE FROM WORK: ICD-10-CM

## 2025-05-12 DIAGNOSIS — J30.1 SEASONAL ALLERGIC RHINITIS DUE TO POLLEN: Primary | ICD-10-CM

## 2025-05-12 PROCEDURE — 99213 OFFICE O/P EST LOW 20 MIN: CPT | Performed by: PHYSICIAN ASSISTANT

## 2025-05-12 NOTE — PROGRESS NOTES
Virtual Regular Visit  Name: Varun Wood      : 2000      MRN: [id number]  Encounter Provider: María Elena Garcia PA-C  Encounter Date: 2025   Encounter department: VIRTUAL CARE       Verification of patient location:  Patient is located at Home [place] the following [place]  Assessment & Plan  Seasonal allergic rhinitis due to pollen         Encounter to obtain excuse from work             Encounter provider María Elena Garcia PA-C    The patient was identified by name and date of birth. Varun Wood was informed that this is a telemedicine visit and that the visit is being conducted through the Epic Embedded platform. He agrees to proceed..  My office door was closed. No one else was in the room. He acknowledged consent and understanding of privacy and security of the video platform. The patient has agreed to participate and understands they can discontinue the visit at any time.    Patient is aware this is a billable service.     History obtained from: patient  History of Present Illness     24 y.o. male presents for evaluation of sore throat, intermittent nasal congestion and cough over last several days. Denies fever, chills, N/V/D, SOB, CP, HA, blurry vision, dizziness or fainting.       Review of Systems   Constitutional:  Positive for fatigue.   HENT:  Positive for congestion, postnasal drip, rhinorrhea, sinus pressure and sinus pain.        Objective   There were no vitals taken for this visit.    Physical Exam  Constitutional:       General: He is not in acute distress.     Appearance: Normal appearance. He is not ill-appearing or toxic-appearing.   HENT:      Head: Normocephalic and atraumatic.      Right Ear: External ear normal.      Left Ear: External ear normal.      Nose: Congestion and rhinorrhea present.      Mouth/Throat:      Mouth: Mucous membranes are moist.      Pharynx: No posterior oropharyngeal erythema.   Eyes:       Extraocular Movements: Extraocular movements intact.      Conjunctiva/sclera: Conjunctivae normal.   Pulmonary:      Effort: Pulmonary effort is normal. No respiratory distress.      Breath sounds: No stridor. No wheezing.   Neurological:      Mental Status: He is alert and oriented to person, place, and time. Mental status is at baseline.   Psychiatric:         Mood and Affect: Mood normal.         Behavior: Behavior normal.         Thought Content: Thought content normal.         Judgment: Judgment normal.         Visit Time  Total Visit Duration: 7 minutes not including the time spent for establishing the audio/video connection.

## 2025-05-12 NOTE — LETTER
May 12, 2025     Patient: Varun Wood  YOB: 2000  Date of Visit: 5/12/2025      To Whom it May Concern:    Varun Wood is under my professional care. Varun was seen in my office on 5/12/2025. Varun may return to work on 5/13/25 .    If you have any questions or concerns, please don't hesitate to call.         Sincerely,          Maraí Elena Garcia PA-C        CC: No Recipients

## 2025-07-06 ENCOUNTER — TELEMEDICINE (OUTPATIENT)
Dept: OTHER | Facility: HOSPITAL | Age: 25
End: 2025-07-06
Payer: COMMERCIAL

## 2025-07-06 DIAGNOSIS — Z02.89 ENCOUNTER TO OBTAIN EXCUSE FROM WORK: Primary | ICD-10-CM

## 2025-07-06 PROCEDURE — 99213 OFFICE O/P EST LOW 20 MIN: CPT | Performed by: PHYSICIAN ASSISTANT

## 2025-07-06 NOTE — LETTER
July 6, 2025     Patient: Varun Wood  YOB: 2000  Date of Visit: 7/6/2025      To Whom it May Concern:    Varun Wood is under my professional care. Varun was seen in my office on 7/6/2025. Varun may return to work on 7/7/25.    If you have any questions or concerns, please don't hesitate to call.         Sincerely,          Your Video Visit Provider        CC: No Recipients

## 2025-07-07 NOTE — PROGRESS NOTES
Virtual Regular Visit  Name: Varun Wood      : 2000      MRN: [id number]  Encounter Provider: Your Video Visit Provider  Encounter Date: 2025   Encounter department: VIRTUAL CARE       Verification of patient location:  Patient is located at Home [place] the [place]  Assessment & Plan  Encounter to obtain excuse from work             Encounter provider Your Video Visit Provider    The patient was identified by name and date of birth. Varun Wood was informed that this is a telemedicine visit and that the visit is being conducted through the Epic Embedded platform. He agrees to proceed..  My office door was closed. No one else was in the room. He acknowledged consent and understanding of privacy and security of the video platform. The patient has agreed to participate and understands they can discontinue the visit at any time.    Patient is aware this is a billable service.     History obtained from: patient  History of Present Illness     PT with runny nose, congestion presents to request a work note. States symptoms improved with Afrin, denies fever, chills, N/V/D, SOB, CP, HA, blurry vision, dizziness, or fainting.      Review of Systems   HENT:  Positive for congestion and rhinorrhea.    All other systems reviewed and are negative.      Objective   There were no vitals taken for this visit.    Physical Exam  Constitutional:       General: He is not in acute distress.     Appearance: Normal appearance. He is not ill-appearing or toxic-appearing.   HENT:      Head: Normocephalic and atraumatic.      Right Ear: External ear normal.      Left Ear: External ear normal.      Nose: Rhinorrhea present.     Eyes:      Extraocular Movements: Extraocular movements intact.      Conjunctiva/sclera: Conjunctivae normal.     Pulmonary:      Effort: Pulmonary effort is normal. No respiratory distress.      Breath sounds: No stridor. No wheezing.      Neurological:      Mental Status: He is alert.     Psychiatric:         Mood and Affect: Mood normal.         Behavior: Behavior normal.         Thought Content: Thought content normal.         Judgment: Judgment normal.         Visit Time  Total Visit Duration: 6 minutes not including the time spent for establishing the audio/video connection.

## 2025-08-10 ENCOUNTER — TELEMEDICINE (OUTPATIENT)
Dept: OTHER | Facility: HOSPITAL | Age: 25
End: 2025-08-10
Payer: COMMERCIAL